# Patient Record
Sex: MALE | URBAN - METROPOLITAN AREA
[De-identification: names, ages, dates, MRNs, and addresses within clinical notes are randomized per-mention and may not be internally consistent; named-entity substitution may affect disease eponyms.]

---

## 2018-08-21 ENCOUNTER — EVALUATION (OUTPATIENT)
Dept: PHYSICAL THERAPY | Facility: CLINIC | Age: 83
End: 2018-08-21
Payer: MEDICARE

## 2018-08-21 ENCOUNTER — TRANSCRIBE ORDERS (OUTPATIENT)
Dept: PHYSICAL THERAPY | Facility: CLINIC | Age: 83
End: 2018-08-21

## 2018-08-21 DIAGNOSIS — R60.0 LOWER EXTREMITY EDEMA: Primary | ICD-10-CM

## 2018-08-21 PROCEDURE — G8978 MOBILITY CURRENT STATUS: HCPCS | Performed by: PHYSICAL THERAPIST

## 2018-08-21 PROCEDURE — G8979 MOBILITY GOAL STATUS: HCPCS | Performed by: PHYSICAL THERAPIST

## 2018-08-21 PROCEDURE — 97162 PT EVAL MOD COMPLEX 30 MIN: CPT | Performed by: PHYSICAL THERAPIST

## 2018-08-21 NOTE — LETTER
2018    Wanderbrian Mcbridedai , [address] [facility] [address]    Patient: Steve Sosa   YOB: 1935   Date of Visit: 2018     Encounter Diagnosis     ICD-10-CM    1  Lower extremity edema R60 0        Dear Dr Soto :    Please review the attached Plan of Care from Shannan John recent visit  Please verify that you agree therapy should continue by signing the attached document and sending it back to our office  If you have any questions or concerns, please don't hesitate to call  Sincerely,    Ravinder Marc PT      Referring Provider:      I certify that I have read the below Plan of Care and certify the need for these services furnished under this plan of treatment while under my care  Ginny Paez DPM  [facility] [address]  VIA Facsimile: [phone]          PT Evaluation     Today's date: 2018  Patient name: Steve Sosa  : 1935  MRN: [id number]  Referring provider: Marcelo Seals DPM  Dx:   Encounter Diagnosis     ICD-10-CM    1  Lower extremity edema R60 0                   Assessment  Impairments: lacks appropriate home exercise program and pain with function  Other impairment: B LE Edema, open wounds  Functional limitations: Limited amb potential, limited activity potential  Assessment details: Pt presents w/ 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, R LE lymphatic exudate from dorsum of foot to medial aspect of ankle w/ superficial wounds, and antalgic gait pattern  Pt had utilized compression wraps in the past but admits they are worn  Pt has been fitted for new garment alternatives (Solaris ready wrap) with plan to establish POC including MLD B LE, use of wraps, and also compression bandaging of R foot/ankle region  Skilled PT is advised  Understanding of Dx/Px/POC: good   Prognosis: good    Goals  ST   Reduce PN <3/10 w/ all activity within 2-3 wks  2  Reduce B LE girth measurements >2 cm within 2-3 wks  LT  Reduce PN <1/10 w/ all activity within 5-6 wks  2  Reduce B LE girth measurements >4cm within 5-6 wks  3  Resume normalized gait w/o AD within 5-6 wks  4  Pt I in phase 2 CDT protocol - self management within 5-6 wks    Plan  Patient would benefit from: skilled physical therapy  Referral necessary: Yes  Planned therapy interventions: manual therapy, massage and compression  Frequency: 3x week  Duration in visits: 15  Duration in weeks: 5  Plan of Care beginning date: 2018  Plan of Care expiration date: 2018  Treatment plan discussed with: patient        Subjective Evaluation    History of Present Illness  Mechanism of injury: Pt admits B LE edema, started approx 4 yrs ago w/ onset while in [facility]  Pt reports seen for tx approx 4 yrs ago, seen x2 visits and was unhappy with care  Maintains wraps he was provided with  Pt denies cardiac hx except HBP -medicated  Denies hx of hepatic issue, but does report renal dysfunction w/ regular f/u w/ kidney specialist   Denies hx of CA  Pt admits his legs have discolored turning "purple and redish/brown"  PN in legs is mild but bothered by walking and standing - annoying discomfort that fluctuates day to day      Recurrent probem    Quality of life: fair    Pain  Current pain ratin  At best pain ratin  At worst pain ratin  Quality: dull ache, discomfort and tight  Relieving factors: rest  Progression: worsening    Treatments  Previous treatment: physical therapy  Patient Goals  Patient goals for therapy: decreased edema, decreased pain, return to sport/leisure activities and independence with ADLs/IADLs          Objective     General Comments     Ankle/Foot Comments   Physical assessment:   Palpation:  Minimal ttp but taut skin w/ clear exudate R dorsum of foot to medial ankle, approx 8 cm proximal to malleolus  Skin Mobility: Poor w/ mod to severe fibrosis B lower legs  Skin color: + Hemosiderin staining B LE  Pitting: +1  Wound presence: R LE dorsum of foot  Wound size: 8cm x10 xm dorsum of foot and 8cm x11cm medial aspect of foot and ankle  Wound color: Superficial, pink, clear exudate, lymph fluid w/ malodorous odor  Stemmer's Sign: +  Gait assessment: Antalgic w/ limited ankle DF/PF, poor hip flexion  Transfer status: I, B UE A   Ability to don/doff clothing/garments: I    Flowsheet Rows      Most Recent Value   girth measurments   Extremity   Lower extremity   LE Girth Measurments  Forefoot, Ankle Figure 8, Malleoli, M+10cm, M+20cm, M+30cm, M+40cm, M+50cm   Forefoot   L Forefoot Initial girth  26 cm   R Forefoot Initial girth  28   Ankle Figure 8   L Ankle Figure 8 Initial girth  64 cm   R Ankle Figure 8 Initial girth  66 cm   Malleoli   L Malleoli Initial girth  31 5 cm   R Malleoli Initial girth  33 cm   M+10cm    L M+10cm Initial girth  34 5 cm   R M+10cm Initial girth  31 5 cm   M+20cm    L M+20cm Initial girth  48 cm   R M+20cm Initial girth  41 5 cm   M+30cm    L M+30cm Initial girth  51 cm   R M+30cm Initial girth  45 cm   M+40cm    L M+40cm Initial girth  47 5 cm   R M+40cm Initial girth  45 cm   M+50cm    L M+50cm Initial girth  53 cm   R M+50cm Initial girth  50 5 cm            Flowsheet Rows      Most Recent Value   PT/OT G-Codes   Current Score  38   Projected Score  60   FOTO information reviewed  Yes   Assessment Type  Evaluation   G code set  Mobility: Walking & Moving Around   Mobility: Walking and Moving Around Current Status ()  CK   Mobility: Walking and Moving Around Goal Status ()  CK          Precautions: B LE lymphedema, R LE open wound    Daily Treatment Diary     Manual  8-21            MLD B LE NV            B LE ready wraps + bandage R foot                                                        Exercise Diary Modalities

## 2018-08-21 NOTE — PROGRESS NOTES
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, R LE lymphatic exudate from dorsum of foot to medial aspect of ankle w/ superficial wounds, and antalgic gait pattern  Pt had utilized compression wraps in the past but admits they are worn  Pt has been fitted for new garment alternatives (Solaris ready wrap) with plan to establish POC including MLD B LE, use of wraps, and also compression bandaging of R foot/ankle region  Skilled PT is advised  Understanding of Dx/Px/POC: good   Prognosis: good    Goals  ST  Reduce PN <3/10 w/ all activity within 2-3 wks  2  Reduce B LE girth measurements >2 cm within 2-3 wks  LT  Reduce PN <1/10 w/ all activity within 5-6 wks  2  Reduce B LE girth measurements >4cm within 5-6 wks  3  Resume normalized gait w/o AD within 5-6 wks  4  Pt I in phase 2 CDT protocol - self management within 5-6 wks    Plan  Patient would benefit from: skilled physical therapy  Referral necessary: Yes  Planned therapy interventions: manual therapy, massage and compression  Frequency: 3x week  Duration in visits: 15  Duration in weeks: 5  Plan of Care beginning date: 2018  Plan of Care expiration date: 2018  Treatment plan discussed with: patient        Subjective Evaluation    History of Present Illness  Mechanism of injury: Pt admits B LE edema, started approx 4 yrs ago w/ onset while in [facility]  Pt reports seen for tx approx 4 yrs ago, seen x2 visits and was unhappy with care  Maintains wraps he was provided with  Pt denies cardiac hx except HBP -medicated  Denies hx of hepatic issue, but does report renal dysfunction w/ regular f/u w/ kidney specialist   Denies hx of CA  Pt admits his legs have discolored turning "purple and redish/brown"  PN in legs is mild but bothered by walking and standing - annoying discomfort that fluctuates day to day      Recurrent probem    Quality of life: fair    Pain  Current pain ratin  At best pain ratin  At worst pain ratin  Quality: dull ache, discomfort and tight  Relieving factors: rest  Progression: worsening    Treatments  Previous treatment: physical therapy  Patient Goals  Patient goals for therapy: decreased edema, decreased pain, return to sport/leisure activities and independence with ADLs/IADLs          Objective     General Comments     Ankle/Foot Comments   Physical assessment:   Palpation:  Minimal ttp but taut skin w/ clear exudate R dorsum of foot to medial ankle, approx 8 cm proximal to malleolus  Skin Mobility: Poor w/ mod to severe fibrosis B lower legs  Skin color: + Hemosiderin staining B LE  Pitting: +1  Wound presence: R LE dorsum of foot  Wound size: 8cm x10 xm dorsum of foot and 8cm x11cm medial aspect of foot and ankle  Wound color: Superficial, pink, clear exudate, lymph fluid w/ malodorous odor  Stemmer's Sign: +  Gait assessment: Antalgic w/ limited ankle DF/PF, poor hip flexion  Transfer status: I, B UE A   Ability to don/doff clothing/garments: I    Flowsheet Rows      Most Recent Value   girth measurments   Extremity   Lower extremity   LE Girth Measurments  Forefoot, Ankle Figure 8, Malleoli, M+10cm, M+20cm, M+30cm, M+40cm, M+50cm   Forefoot   L Forefoot Initial girth  26 cm   R Forefoot Initial girth  28   Ankle Figure 8   L Ankle Figure 8 Initial girth  64 cm   R Ankle Figure 8 Initial girth  66 cm   Malleoli   L Malleoli Initial girth  31 5 cm   R Malleoli Initial girth  33 cm   M+10cm    L M+10cm Initial girth  34 5 cm   R M+10cm Initial girth  31 5 cm   M+20cm    L M+20cm Initial girth  48 cm   R M+20cm Initial girth  41 5 cm   M+30cm    L M+30cm Initial girth  51 cm   R M+30cm Initial girth  45 cm   M+40cm    L M+40cm Initial girth  47 5 cm   R M+40cm Initial girth  45 cm   M+50cm    L M+50cm Initial girth  53 cm   R M+50cm Initial girth  50 5 cm            Flowsheet Rows      Most Recent Value   PT/OT G-Codes   Current Score  38   Projected Score  60   FOTO information reviewed  Yes   Assessment Type  Evaluation   G code set  Mobility: Walking & Moving Around   Mobility: Walking and Moving Around Current Status ()  CK   Mobility: Walking and Moving Around Goal Status ()  CK          Precautions: B LE lymphedema, R LE open wound    Daily Treatment Diary     Manual  8-21            MLD B LE NV            B LE ready wraps + bandage R foot                                                        Exercise Diary                                                                                                                                                                                                                                                                                      Modalities

## 2018-08-28 ENCOUNTER — OFFICE VISIT (OUTPATIENT)
Dept: PHYSICAL THERAPY | Facility: CLINIC | Age: 83
End: 2018-08-28
Payer: MEDICARE

## 2018-08-28 DIAGNOSIS — R60.0 LOWER EXTREMITY EDEMA: Primary | ICD-10-CM

## 2018-08-28 PROCEDURE — 97140 MANUAL THERAPY 1/> REGIONS: CPT | Performed by: PHYSICAL THERAPIST

## 2018-08-28 NOTE — PROGRESS NOTES
Daily Note     Today's date: 2018  Patient name: Alfonso Culver  : 1935  MRN: [id number]  Referring provider: Elisha Hernandez DPM  Dx:   Encounter Diagnosis     ICD-10-CM    1  Lower extremity edema R60 0                   Subjective: Pt presents w/ slight weeping R LE and w/ newly ordered supplies for tx this day  Objective: See treatment diary below      Assessment: Tolerated treatment well  Patient would benefit from continued PT  Good response to initial MLD and application of new wraps including bandaging of R foot to address more severe edema and also present weeping of R ankle and foot  Plan: Continue per plan of care  Precautions: B LE lymphedema, LOB/fall risk    Daily Treatment Diary     Manual              MLD B LE x50'            Solaris ready wraps B LE + bandage R foot/ankle    x10'                                                       Exercise Diary                                                                                                                                                                                                                                                                                      Modalities

## 2018-08-31 ENCOUNTER — OFFICE VISIT (OUTPATIENT)
Dept: PHYSICAL THERAPY | Facility: CLINIC | Age: 83
End: 2018-08-31
Payer: MEDICARE

## 2018-08-31 DIAGNOSIS — R60.0 LOWER EXTREMITY EDEMA: Primary | ICD-10-CM

## 2018-08-31 PROCEDURE — 97140 MANUAL THERAPY 1/> REGIONS: CPT | Performed by: PHYSICAL THERAPIST

## 2018-08-31 NOTE — PROGRESS NOTES
Daily Note     Today's date: 2018  Patient name: Dago Guzman  : 1935  MRN: [id number]  Referring provider: Lisy Sharp DPM  Dx:   Encounter Diagnosis     ICD-10-CM    1  Lower extremity edema R60 0                   Subjective: Pt presents citing reduced girth of B LE, but still a weeping of R LE  Objective: See treatment diary below      Assessment: Tolerated treatment well  Patient would benefit from continued PT  Good response to MLD, added cotton to R ankle to provide additional cushion as pt reports poor tolerance to pressure around foot and ankle region, irritated w/ last session  Assess response NV  Plan: Continue per plan of care  Precautions: B LE lymphedema, LOB/fall risk    Daily Treatment Diary     Manual             MLD B LE x50' x50'           Solaris ready wraps B LE + bandage R foot/ankle    x10' x10'                                                      Exercise Diary                                                                                                                                                                                                                                                                                      Modalities

## 2018-09-10 ENCOUNTER — APPOINTMENT (OUTPATIENT)
Dept: PHYSICAL THERAPY | Facility: CLINIC | Age: 83
End: 2018-09-10
Payer: MEDICARE

## 2018-09-11 ENCOUNTER — OFFICE VISIT (OUTPATIENT)
Dept: PHYSICAL THERAPY | Facility: CLINIC | Age: 83
End: 2018-09-11
Payer: MEDICARE

## 2018-09-11 DIAGNOSIS — R60.0 LOWER EXTREMITY EDEMA: Primary | ICD-10-CM

## 2018-09-11 PROCEDURE — 97140 MANUAL THERAPY 1/> REGIONS: CPT | Performed by: PHYSICAL THERAPIST

## 2018-09-11 NOTE — PROGRESS NOTES
Daily Note     Today's date: 2018  Patient name: Albina Valdovinos  : 1935  MRN: [id number]  Referring provider: Alan Christine DPM  Dx:   Encounter Diagnosis     ICD-10-CM    1  Lower extremity edema R60 0                   Subjective: Pt admits managing wraps while therapist away, however c/o this past Saturday x approx 18 hrs a sharp throbbing PN in ankle R LE, since subsided  Darylene Brake has reduced  Objective: See treatment diary below      Assessment: Tolerated treatment well  Patient would benefit from continued PT  Reduced weeping now minimal noted R LE, however significant dry scaly skin persists R>L LE  Overall vol reduction advancing  Plan: Continue per plan of care  Precautions: B LE lymphedema, LOB/fall risk    Daily Treatment Diary     Manual   8 9-11          MLD B LE x50' x50' x50'          Solaris ready wraps B LE + bandage R foot/ankle    x10' x10' x10'                                                      Exercise Diary                                                                                                                                                                                                                                                                                      Modalities

## 2018-09-12 ENCOUNTER — OFFICE VISIT (OUTPATIENT)
Dept: PHYSICAL THERAPY | Facility: CLINIC | Age: 83
End: 2018-09-12
Payer: MEDICARE

## 2018-09-12 DIAGNOSIS — R60.0 LOWER EXTREMITY EDEMA: Primary | ICD-10-CM

## 2018-09-12 PROCEDURE — 97140 MANUAL THERAPY 1/> REGIONS: CPT | Performed by: PHYSICAL THERAPIST

## 2018-09-12 NOTE — PROGRESS NOTES
Daily Note     Today's date: 2018  Patient name: Winsome Fine  : 1935  MRN: [id number]  Referring provider: Adrianna Thornton DPM  Dx:   Encounter Diagnosis     ICD-10-CM    1  Lower extremity edema R60 0                   Subjective: Pt states "just one area of weeping noted R LE today "  Objective: See treatment diary below      Assessment: Tolerated treatment well  Patient would benefit from continued PT  Cont'd vol reduction advancing B LE, slight reduction in overall weeping and open regions R LE  Plan: Continue per plan of care  Precautions: B LE lymphedema, LOB/fall risk    Daily Treatment Diary     Manual  8 8-31 9-11 9-12         MLD B LE x50' x50' x50' x45'         Solaris ready wraps B LE + bandage R foot/ankle    x10' x10' x10'  x10'                                                    Exercise Diary                                                                                                                                                                                                                                                                                      Modalities

## 2018-09-13 ENCOUNTER — OFFICE VISIT (OUTPATIENT)
Dept: PHYSICAL THERAPY | Facility: CLINIC | Age: 83
End: 2018-09-13
Payer: MEDICARE

## 2018-09-13 DIAGNOSIS — R60.0 LOWER EXTREMITY EDEMA: Primary | ICD-10-CM

## 2018-09-13 PROCEDURE — 97140 MANUAL THERAPY 1/> REGIONS: CPT | Performed by: PHYSICAL THERAPIST

## 2018-09-13 NOTE — PROGRESS NOTES
Daily Note     Today's date: 2018  Patient name: Alfonso Culver  : 1935  MRN: [id number]  Referring provider: Elisha Hernandez DPM  Dx:   Encounter Diagnosis     ICD-10-CM    1  Lower extremity edema R60 0                   Subjective: Pt states I took a lot of skin off this morning when I showered, also admits PN in R ankle this AM   Rated 5-6/10  Objective: See treatment diary below      Assessment: Tolerated treatment well  Patient would benefit from continued PT  Minimal weeping R LE this AM, mild R medial ankle region  Reduced overall  Plan: Continue per plan of care  Precautions: B LE lymphedema, LOB/fall risk    Daily Treatment Diary     Manual  8- 8-31 9-11 9-12 9-13        MLD B LE x50' x50' x50' x45' x45'        Solaris ready wraps B LE + bandage R foot/ankle    x10' x10' x10'  x10' x10'                                                   Exercise Diary                                                                                                                                                                                                                                                                                      Modalities

## 2018-09-17 ENCOUNTER — OFFICE VISIT (OUTPATIENT)
Dept: PHYSICAL THERAPY | Facility: CLINIC | Age: 83
End: 2018-09-17
Payer: MEDICARE

## 2018-09-17 DIAGNOSIS — R60.0 LOWER EXTREMITY EDEMA: Primary | ICD-10-CM

## 2018-09-17 PROCEDURE — 97140 MANUAL THERAPY 1/> REGIONS: CPT | Performed by: PHYSICAL THERAPIST

## 2018-09-17 NOTE — PROGRESS NOTES
Daily Note     Today's date: 2018  Patient name: Mahesh Livingston  : 1935  MRN: [id number]  Referring provider: Peyman Maxwell DPM  Dx:   Encounter Diagnosis     ICD-10-CM    1  Lower extremity edema R60 0                   Subjective: Pt reports about 50% less exudate since starting, noticing less edema overall, but PN in ankle and legs limited his ability to shoot competitively OTW  Objective: See treatment diary below      Assessment: Tolerated treatment well  Patient would benefit from continued PT  Medial ankle exudate realized this AM as MLD performed  Pt inquires as to suitable creams to apply to aide in PN reduction R foot/heel  Monitor closely to ensure minimal infection risk  Plan: Continue per plan of care  Precautions: B LE lymphedema, LOB/fall risk    Daily Treatment Diary     Manual  8 8 9-11 9-12 9-13 9-17       MLD B LE x50' x50' x50' x45' x45' x50''       Solaris ready wraps B LE + bandage R foot/ankle    x10' x10' x10'  x10' x10' x10'                                                  Exercise Diary                                                                                                                                                                                                                                                                                      Modalities

## 2018-09-19 ENCOUNTER — OFFICE VISIT (OUTPATIENT)
Dept: PHYSICAL THERAPY | Facility: CLINIC | Age: 83
End: 2018-09-19
Payer: MEDICARE

## 2018-09-19 DIAGNOSIS — R60.0 LOWER EXTREMITY EDEMA: Primary | ICD-10-CM

## 2018-09-19 PROCEDURE — G8979 MOBILITY GOAL STATUS: HCPCS | Performed by: PHYSICAL THERAPIST

## 2018-09-19 PROCEDURE — G8980 MOBILITY D/C STATUS: HCPCS | Performed by: PHYSICAL THERAPIST

## 2018-09-19 PROCEDURE — 97140 MANUAL THERAPY 1/> REGIONS: CPT | Performed by: PHYSICAL THERAPIST

## 2018-09-19 PROCEDURE — G8978 MOBILITY CURRENT STATUS: HCPCS | Performed by: PHYSICAL THERAPIST

## 2018-09-19 NOTE — PROGRESS NOTES
Daily Note + PT Discharge     Today's date: 2018  Patient name: Sheila Gomez  : 1935  MRN: [id number]  Referring provider: Josiah Chakraborty DPM  Dx:   Encounter Diagnosis     ICD-10-CM    1  Lower extremity edema R60 0                   Subjective: Pt presents w/ increased redness thru R foot, non-maplike borders, extending to distal third of lower leg anteriorly and wrapping around medial and lat malleoli  Pt admits more PN and also visible clear exudate is realized this day w/ more then typical collecting along sock  Objective: See treatment diary below      Assessment: Tolerated treatment well  Patient would benefit from continued PT   Pt's family MD office contact by me this day w/ pt present, attempted to discuss w/ pt care team but opted to schedule dr joe at 1 pm this day w/ pt's PCP office over concerns of above stated symptoms and probable need for antibiotics  Pt does admit hx of antibiotic tx >2 months ago which had helped to resolve similar sx's  Plan: Pt now d/c skilled PT secondary to complications regarding cellulitis and MD placing pt on hold  Precautions: B LE lymphedema, LOB/fall risk    Daily Treatment Diary     Manual   8 9-11 9-12 9-13 9-17 9-19      MLD B LE x50' x50' x50' x45' x45' x50'' x50'      Solaris ready wraps B LE + bandage R foot/ankle    x10' x10' x10'  x10' x10' x10' x10'                                                 Exercise Diary                                                                                                                                                                                                                                                                                      Modalities

## 2018-09-20 ENCOUNTER — APPOINTMENT (OUTPATIENT)
Dept: PHYSICAL THERAPY | Facility: CLINIC | Age: 83
End: 2018-09-20
Payer: MEDICARE

## 2018-09-24 ENCOUNTER — APPOINTMENT (OUTPATIENT)
Dept: PHYSICAL THERAPY | Facility: CLINIC | Age: 83
End: 2018-09-24
Payer: MEDICARE

## 2018-09-26 ENCOUNTER — APPOINTMENT (OUTPATIENT)
Dept: PHYSICAL THERAPY | Facility: CLINIC | Age: 83
End: 2018-09-26
Payer: MEDICARE

## 2018-09-28 ENCOUNTER — APPOINTMENT (OUTPATIENT)
Dept: PHYSICAL THERAPY | Facility: CLINIC | Age: 83
End: 2018-09-28
Payer: MEDICARE

## 2019-01-07 ENCOUNTER — TRANSCRIBE ORDERS (OUTPATIENT)
Dept: PHYSICAL THERAPY | Facility: CLINIC | Age: 84
End: 2019-01-07

## 2019-01-07 ENCOUNTER — EVALUATION (OUTPATIENT)
Dept: PHYSICAL THERAPY | Facility: CLINIC | Age: 84
End: 2019-01-07
Payer: MEDICARE

## 2019-01-07 DIAGNOSIS — I89.0 CHRONIC ACQUIRED LYMPHEDEMA: Primary | ICD-10-CM

## 2019-01-07 DIAGNOSIS — I89.0 LYMPHEDEMA: Primary | ICD-10-CM

## 2019-01-07 PROCEDURE — 97162 PT EVAL MOD COMPLEX 30 MIN: CPT | Performed by: PHYSICAL THERAPIST

## 2019-01-07 NOTE — LETTER
2019    Jose Barrett DPM  [id number] [place] [id number]    Patient: Chana Pettit   YOB: 1935   Date of Visit: 2019     Encounter Diagnosis     ICD-10-CM    1  Lymphedema I89 0        Dear Dr Efren Laguerre:    Please review the attached Plan of Care from Herbert Sanchez recent visit  Please verify that you agree therapy should continue by signing the attached document and sending it back to our office  If you have any questions or concerns, please don't hesitate to call  Sincerely,    Penelope Deleon, PT      Referring Provider:      I certify that I have read the below Plan of Care and certify the need for these services furnished under this plan of treatment while under my care  Jose Barrett DPM  [facility]  VIA Facsimile: [phone]          PT Evaluation     Today's date: 2019  Patient name: Chana Pettit  : 1935  MRN: [id number]  Referring provider: Nnamdi Vaz DPM  Dx:   Encounter Diagnosis     ICD-10-CM    1  Lymphedema I89 0                   Assessment  Assessment details: B LE edema is noted w/ severe skin deterioration R lower leg and marked hemosiderin staining B  Skilled PT is necessary to address including full CDT protocol to involve MLD, compression wraps and bandaging as appropriate w/ education regarding skin care and daily application of lotion including Rx cream     Other impairment: B LE edema  Functional limitations: Limited amb potential and poor balanceUnderstanding of Dx/Px/POC: good   Prognosis: good    Goals  ST  Reduce B LE PN < 3/10 w/ all activity within 3 wks  2  Reduce B LE girth measurements >1 cm within 3 wks  LT  Reduce B LE girth measurements >3 cm within 6 wks  2  Reduce PN <1/10 within 6 wks  3   I in phase 2 CDT protocol within 6 wks    Plan  Patient would benefit from: skilled physical therapy  Referral necessary: Yes  Planned therapy interventions: manual therapy, massage and compression  Frequency: 3x week  Duration in visits: 15  Duration in weeks: 5  Plan of Care beginning date: 2019  Plan of Care expiration date: 2019  Treatment plan discussed with: patient        Subjective Evaluation    History of Present Illness  Mechanism of injury: Pt presents to PT w/ c/o B LE edema, R>L LE w/ sx's fluctuating but worse w/o compression  Pt had initially been seen for skilled PT to address utilizing CDT protocol however had been placed on hold and then d/c following a bout of cellulitis  Pt now returns admitting overall better management of legs but sx's more recently worsened and at times affecting his balance , amb, and activity potential   Pt utilizes a variety of Rx creams for legs and maintains wraps  Recurrent probem    Quality of life: fair    Pain  Current pain ratin  At best pain ratin  At worst pain ratin  Quality: dull ache, discomfort, cramping, tight and squeezing  Relieving factors: rest and support  Aggravating factors: walking    Treatments  Previous treatment: physical therapy  Patient Goals  Patient goals for therapy: decreased edema, independence with ADLs/IADLs, return to sport/leisure activities and improved balance          Objective     General Comments     Ankle/Foot Comments   Physical assessment: R LE severe scaling and dryness of skin w/ mod edema  Palpation: Mild ttp R>L LE  Skin Mobility: L LE fair to poor, R LE Poor w/ mod to severe fibrosis  Skin color: + hemosiderin staining B LE distal 1/2 of B lower legs  Pitting: +1  Wound presence: R LE multiple surface wounds 2cm x 1cm and 2cm x3 5cm lower yaya-medial aspect of leg w/ small bloody exudate from pt scratching at leg  Wound size: see above  Wound color: see above  Stemmer's Sign: + B LE  Gait assessment: Antalgic, poor balance w/ limited heel to toe, slight festination and reduced MICHELLE     Transfer status:  I w/ B UE A  Ability to don/doff clothing/garments: Limited         Flowsheet Rows      Most Recent Value   girth measurments   Extremity   Lower extremity   LE Girth Measurments  Forefoot, Ankle Figure 8, Malleoli, M+10cm, M+20cm, M+30cm, M+40cm, M+50cm   Forefoot   L Forefoot Initial girth  27 cm   R Forefoot Initial girth  27 5   Ankle Figure 8   L Ankle Figure 8 Initial girth  65 cm   R Ankle Figure 8 Initial girth  64 cm   Malleoli   L Malleoli Initial girth  34 cm   R Malleoli Initial girth  31 5 cm   M+10cm    L M+10cm Initial girth  31 cm   R M+10cm Initial girth  29 5 cm   M+20cm    L M+20cm Initial girth  39 5 cm   R M+20cm Initial girth  38 75 cm   M+30cm    L M+30cm Initial girth  45 cm   R M+30cm Initial girth  43 5 cm   M+40cm    L M+40cm Initial girth  46 cm   R M+40cm Initial girth  48 cm   M+50cm    L M+50cm Initial girth  51 5 cm   R M+50cm Initial girth  52 25 cm            Precautions: NKA, B LE Lymphedema    Daily Treatment Diary     Manual  1-7            MLD B LE NV            B LE ready wraps + compression bandage NV                                                       Exercise Diary                                                                                                                                                                                                                                                                                      Modalities

## 2019-01-07 NOTE — PROGRESS NOTES
PT Evaluation     Today's date: 2019  Patient name: Marcy Lassiter  : 1935  MRN: [id number]  Referring provider: Pili Vanessa DPM  Dx:   Encounter Diagnosis     ICD-10-CM    1  Lymphedema I89 0                   Assessment  Assessment details: B LE edema is noted w/ severe skin deterioration R lower leg and marked hemosiderin staining B  Skilled PT is necessary to address including full CDT protocol to involve MLD, compression wraps and bandaging as appropriate w/ education regarding skin care and daily application of lotion including Rx cream     Other impairment: B LE edema  Functional limitations: Limited amb potential and poor balanceUnderstanding of Dx/Px/POC: good   Prognosis: good    Goals  ST  Reduce B LE PN < 3/10 w/ all activity within 3 wks  2  Reduce B LE girth measurements >1 cm within 3 wks  LT  Reduce B LE girth measurements >3 cm within 6 wks  2  Reduce PN <1/10 within 6 wks  3  I in phase 2 CDT protocol within 6 wks    Plan  Patient would benefit from: skilled physical therapy  Referral necessary: Yes  Planned therapy interventions: manual therapy, massage and compression  Frequency: 3x week  Duration in visits: 15  Duration in weeks: 5  Plan of Care beginning date: 2019  Plan of Care expiration date: 2019  Treatment plan discussed with: patient        Subjective Evaluation    History of Present Illness  Mechanism of injury: Pt presents to PT w/ c/o B LE edema, R>L LE w/ sx's fluctuating but worse w/o compression  Pt had initially been seen for skilled PT to address utilizing CDT protocol however had been placed on hold and then d/c following a bout of cellulitis  Pt now returns admitting overall better management of legs but sx's more recently worsened and at times affecting his balance , amb, and activity potential   Pt utilizes a variety of Rx creams for legs and maintains wraps     Recurrent probem    Quality of life: fair    Pain  Current pain rating: 4  At best pain ratin  At worst pain ratin  Quality: dull ache, discomfort, cramping, tight and squeezing  Relieving factors: rest and support  Aggravating factors: walking    Treatments  Previous treatment: physical therapy  Patient Goals  Patient goals for therapy: decreased edema, independence with ADLs/IADLs, return to sport/leisure activities and improved balance          Objective     General Comments     Ankle/Foot Comments   Physical assessment: R LE severe scaling and dryness of skin w/ mod edema  Palpation: Mild ttp R>L LE  Skin Mobility: L LE fair to poor, R LE Poor w/ mod to severe fibrosis  Skin color: + hemosiderin staining B LE distal 1/2 of B lower legs  Pitting: +1  Wound presence: R LE multiple surface wounds 2cm x 1cm and 2cm x3 5cm lower yaya-medial aspect of leg w/ small bloody exudate from pt scratching at leg  Wound size: see above  Wound color: see above  Stemmer's Sign: + B LE  Gait assessment: Antalgic, poor balance w/ limited heel to toe, slight festination and reduced MICHELLE     Transfer status:  I w/ B UE A  Ability to don/doff clothing/garments: Limited         Flowsheet Rows      Most Recent Value   girth measurments   Extremity   Lower extremity   LE Girth Measurments  Forefoot, Ankle Figure 8, Malleoli, M+10cm, M+20cm, M+30cm, M+40cm, M+50cm   Forefoot   L Forefoot Initial girth  27 cm   R Forefoot Initial girth  27 5   Ankle Figure 8   L Ankle Figure 8 Initial girth  65 cm   R Ankle Figure 8 Initial girth  64 cm   Malleoli   L Malleoli Initial girth  34 cm   R Malleoli Initial girth  31 5 cm   M+10cm    L M+10cm Initial girth  31 cm   R M+10cm Initial girth  29 5 cm   M+20cm    L M+20cm Initial girth  39 5 cm   R M+20cm Initial girth  38 75 cm   M+30cm    L M+30cm Initial girth  45 cm   R M+30cm Initial girth  43 5 cm   M+40cm    L M+40cm Initial girth  46 cm   R M+40cm Initial girth  48 cm   M+50cm    L M+50cm Initial girth  51 5 cm   R M+50cm Initial girth  52 25 cm Precautions: NKA, B LE Lymphedema    Daily Treatment Diary     Manual  1-7            MLD B LE NV            B LE ready wraps + compression bandage NV                                                       Exercise Diary                                                                                                                                                                                                                                                                                      Modalities

## 2019-01-14 ENCOUNTER — OFFICE VISIT (OUTPATIENT)
Dept: PHYSICAL THERAPY | Facility: CLINIC | Age: 84
End: 2019-01-14
Payer: MEDICARE

## 2019-01-14 DIAGNOSIS — I89.0 LYMPHEDEMA: Primary | ICD-10-CM

## 2019-01-14 PROCEDURE — 97140 MANUAL THERAPY 1/> REGIONS: CPT | Performed by: PHYSICAL THERAPIST

## 2019-01-14 NOTE — PROGRESS NOTES
Daily Note     Today's date: 2019  Patient name: Karla Calles  : 1935  MRN: [id number]  Referring provider: Rolanda Gregory DPM  Dx:   Encounter Diagnosis     ICD-10-CM    1  Lymphedema I89 0                   Subjective: Presents wraps and lotion for B LE this day  Objective: See treatment diary below      Assessment: Tolerated treatment well  Patient would benefit from continued PT  Observed L lower leg across anterior aspect a 0 25cm x 3 cm scratch w/ slight bleeding, advised pt to monitor  Good initial response to MLD  Plan: Continue per plan of care       Precautions: NKA, COURTNEY LE Lymphedema    Daily Treatment Diary     Manual  1-7 1-14           MLD B LE NV x45'           B LE ready wraps + compression bandage NV x10'                                                      Exercise Diary                                                                                                                                                                                                                                                                                      Modalities

## 2019-01-16 ENCOUNTER — OFFICE VISIT (OUTPATIENT)
Dept: PHYSICAL THERAPY | Facility: CLINIC | Age: 84
End: 2019-01-16
Payer: MEDICARE

## 2019-01-16 DIAGNOSIS — I89.0 LYMPHEDEMA: Primary | ICD-10-CM

## 2019-01-16 PROCEDURE — 97140 MANUAL THERAPY 1/> REGIONS: CPT | Performed by: PHYSICAL THERAPIST

## 2019-01-16 NOTE — PROGRESS NOTES
Daily Note     Today's date: 2019  Patient name: Higinio Lassiter  : 1935  MRN: [id number]  Referring provider: Oscar Vivas DPM  Dx:   Encounter Diagnosis     ICD-10-CM    1  Lymphedema I89 0                   Subjective: Pt admits legs "doing okay" citing mild reduction w/ last tx session  Objective: See treatment diary below      Assessment: Tolerated treatment well  Patient would benefit from continued PT  Mild reduction observed, assessed grossly along w/ better skin mobility but still marked discoloration  Plan: Continue per plan of care       Precautions: COURTNEY VIERA LE Lymphedema    Daily Treatment Diary     Manual  1-7 1-14 1-16          MLD B LE NV x45' jph          B LE ready wraps + compression bandage NV x10' jph                                       x55'              Exercise Diary                                                                                                                                                                                                                                                                                      Modalities

## 2019-01-18 ENCOUNTER — OFFICE VISIT (OUTPATIENT)
Dept: PHYSICAL THERAPY | Facility: CLINIC | Age: 84
End: 2019-01-18
Payer: MEDICARE

## 2019-01-18 DIAGNOSIS — I89.0 LYMPHEDEMA: Primary | ICD-10-CM

## 2019-01-18 PROCEDURE — 97140 MANUAL THERAPY 1/> REGIONS: CPT | Performed by: PHYSICAL THERAPIST

## 2019-01-18 NOTE — PROGRESS NOTES
Daily Note     Today's date: 2019  Patient name: Higinio Lassiter  : 1935  MRN: [id number]  Referring provider: Oscar Vivas DPM  Dx:   Encounter Diagnosis     ICD-10-CM    1  Lymphedema I89 0                   Subjective: Pt admits legs "doing okay" citing mild reduction w/ last tx session  Objective: See treatment diary below      Assessment: Tolerated treatment well  Patient would benefit from continued PT  Mild reduction observed, assessed grossly along w/ better skin mobility but still marked discoloration  Plan: Continue per plan of care       Precautions: COURTNEY VIERA Lymphedema    Daily Treatment Diary     Manual  1-7 1-14 1-16          MLD B LE NV x45' jph          B LE ready wraps + compression bandage NV x10' jph                                       x55'              Exercise Diary                                                                                                                                                                                                                                                                                      Modalities

## 2019-01-21 ENCOUNTER — APPOINTMENT (OUTPATIENT)
Dept: PHYSICAL THERAPY | Facility: CLINIC | Age: 84
End: 2019-01-21
Payer: MEDICARE

## 2019-01-23 ENCOUNTER — OFFICE VISIT (OUTPATIENT)
Dept: PHYSICAL THERAPY | Facility: CLINIC | Age: 84
End: 2019-01-23
Payer: MEDICARE

## 2019-01-23 DIAGNOSIS — I89.0 LYMPHEDEMA: Primary | ICD-10-CM

## 2019-01-23 PROCEDURE — 97140 MANUAL THERAPY 1/> REGIONS: CPT | Performed by: PHYSICAL THERAPIST

## 2019-01-23 NOTE — PROGRESS NOTES
Daily Note     Today's date: 2019  Patient name: Alee Andrade  : 1935  MRN: [id number]  Referring provider: Delmi Mendoza DPM  Dx:   Encounter Diagnosis     ICD-10-CM    1  Lymphedema I89 0                   Subjective: Pt admits wearing wraps overnight as he had not put them on earlier yesterday by accident  Objective: See treatment diary below      Assessment: Tolerated treatment well  Patient would benefit from continued PT  Good reduction B LE w/ overall improved skin health, R LE remains more edematous that L  Plan: Continue per plan of care       Precautions: COURTNEY VIERA Lymphedema    Daily Treatment Diary     Manual  1-7 1-14 1-16          MLD B LE NV x45' [facility]         B LE ready wraps + [facility]' [facility]                                      x55' x55'             Exercise Diary                                                                                                                                                                                                                                                                                      Modalities

## 2019-01-25 ENCOUNTER — OFFICE VISIT (OUTPATIENT)
Dept: PHYSICAL THERAPY | Facility: CLINIC | Age: 84
End: 2019-01-25
Payer: MEDICARE

## 2019-01-25 DIAGNOSIS — I89.0 LYMPHEDEMA: Primary | ICD-10-CM

## 2019-01-25 PROCEDURE — 97140 MANUAL THERAPY 1/> REGIONS: CPT | Performed by: PHYSICAL THERAPIST

## 2019-01-25 NOTE — PROGRESS NOTES
Daily Note     Today's date: 2019  Patient name: Marcy Lassiter  : 1935  MRN: [id number]  Referring provider: Pili Vanessa DPM  Dx:   Encounter Diagnosis     ICD-10-CM    1  Lymphedema I89 0                   Subjective: Pt reports B LE dry skin pretty significant  Hasn't applied lotion yet this AM     Objective: See treatment diary below      Assessment: Tolerated treatment well  Patient would benefit from continued PT  Moderate fibrosis and heaviness to B LE this AM, overall vol reduction but pretty significant dry scaly skin noted  Advised pt to maintain lotion 2x per day and wraps t/o the waking hrs of the day  Plan: Continue per plan of care       Precautions: COURTNEY VIERA LE Lymphedema    Daily Treatment Diary     Manual  1-7 1-14 1-16 1 1-        MLD B LE NV x45' jph [facility]        B LE ready wraps + compression bandage NV x10' jph jph jph                                     x55' x55' x60'            Exercise Diary                                                                                                                                                                                                                                                                                      Modalities

## 2019-01-28 ENCOUNTER — OFFICE VISIT (OUTPATIENT)
Dept: PHYSICAL THERAPY | Facility: CLINIC | Age: 84
End: 2019-01-28
Payer: MEDICARE

## 2019-01-28 DIAGNOSIS — I89.0 LYMPHEDEMA: Primary | ICD-10-CM

## 2019-01-28 PROCEDURE — 97140 MANUAL THERAPY 1/> REGIONS: CPT | Performed by: PHYSICAL THERAPIST

## 2019-01-28 NOTE — PROGRESS NOTES
Daily Note     Today's date: 2019  Patient name: Jose Manuel Ricci  : 1935  MRN: [id number]  Referring provider: Gabriela Joseph DPM  Dx:   Encounter Diagnosis     ICD-10-CM    1  Lymphedema I89 0                   Subjective: Pt admits R calf burning yesterday, removed wraps, sx's resolved  Objective: See treatment diary below      Assessment: Tolerated treatment well  Patient would benefit from continued PT  No indicators of infection of DVT this day R LE and sx's resolved from yesterday  Cont care as B LE edema moderate this day and cont'd CDT required  Plan: Continue per plan of care       Precautions: NKA, B LE Lymphedema    Daily Treatment Diary     Manual  -14        MLD B LE NV x45' jph jph jph jph       B LE ready wraps + compression bandage NV x10' jph jph jph jph                                    Q75' x55' x60' x55'           Exercise Diary                                                                                                                                                                                                                                                                                      Modalities

## 2019-01-30 ENCOUNTER — OFFICE VISIT (OUTPATIENT)
Dept: PHYSICAL THERAPY | Facility: CLINIC | Age: 84
End: 2019-01-30
Payer: MEDICARE

## 2019-01-30 DIAGNOSIS — I89.0 LYMPHEDEMA: Primary | ICD-10-CM

## 2019-01-30 PROCEDURE — 97140 MANUAL THERAPY 1/> REGIONS: CPT | Performed by: PHYSICAL THERAPIST

## 2019-01-30 NOTE — PROGRESS NOTES
Daily Note     Today's date: 2019  Patient name: Johanne Marks  : 1935  MRN: [id number]  Referring provider: Hugo Rodriguez DPM  Dx:   Encounter Diagnosis     ICD-10-CM    1  Lymphedema I89 0                   Subjective: Pt admits B LE doing better, applying lotion daily  Does have knee PN B LE, worse in knees as of late  Objective: See treatment diary below    Assessment: Tolerated treatment well  Patient would benefit from continued PT  B LE improving, overall skin integrity better and vol reducing,  Trial reduced to 2x/wl for next 2 wks  Plan: Continue per plan of care       Precautions: NKA, B LE Lymphedema    Daily Treatment Diary     Manual  1-7 1-14 1-16       MLD B LE NV x45' [facility]      B LE ready wraps + compression bandage NV x10' [facility]                                   R01' x55' x60' x55' x55'          Exercise Diary                                                                                                                                                                                                                                                                                      Modalities

## 2019-02-01 ENCOUNTER — OFFICE VISIT (OUTPATIENT)
Dept: PHYSICAL THERAPY | Facility: CLINIC | Age: 84
End: 2019-02-01
Payer: MEDICARE

## 2019-02-01 DIAGNOSIS — I89.0 LYMPHEDEMA: Primary | ICD-10-CM

## 2019-02-01 PROCEDURE — 97140 MANUAL THERAPY 1/> REGIONS: CPT | Performed by: PHYSICAL THERAPIST

## 2019-02-01 NOTE — PROGRESS NOTES
Daily Note     Today's date: 2019  Patient name: Armida Singh  : 1935  MRN: [id number]  Referring provider: Kolby Courtney DPM  Dx:   Encounter Diagnosis     ICD-10-CM    1  Lymphedema I89 0                   Subjective: Pt reports PN in knees not as severe this day but still sore  Objective: See treatment diary below    Assessment: Tolerated treatment well  Patient would benefit from continued PT Vol reducing overall, will assess measurements NV  Reduced fibrosis noted thru B lower legs and pt maintaining wraps daily  Plan: Continue per plan of care       Precautions: COURTNEY VIERA LE Lymphedema    Daily Treatment Diary     Manual  1-7 1-14 1-16 1- 1 130 2-1     MLD B LE NV x45' jph jph jph jph jph jph     B LE ready wraps + compression bandage NV x10' jph jph jph jph jph jph                                  A22' x55' x60' x55' x55' x53'         Exercise Diary                                                                                                                                                                                                                                                                                      Modalities

## 2019-02-04 ENCOUNTER — OFFICE VISIT (OUTPATIENT)
Dept: PHYSICAL THERAPY | Facility: CLINIC | Age: 84
End: 2019-02-04
Payer: MEDICARE

## 2019-02-04 DIAGNOSIS — I89.0 LYMPHEDEMA: Primary | ICD-10-CM

## 2019-02-04 PROCEDURE — 97140 MANUAL THERAPY 1/> REGIONS: CPT | Performed by: PHYSICAL THERAPIST

## 2019-02-04 NOTE — PROGRESS NOTES
Daily Note     Today's date: 2019  Patient name: Marycarmen Giron  : 1935  MRN: [id number]  Referring provider: María Hwang DPM  Dx:   Encounter Diagnosis     ICD-10-CM    1  Lymphedema I89 0                   Subjective: Pt c/o PN B LE "like they are in vice " worse today for unknown reasons  Objective: See treatment diary below    Assessment: Tolerated treatment well  Patient would benefit from continued PT Pt admits mild PN reduction when wearing wraps  To see PN mngmt upcoming this wk  Plan: Continue per plan of care       Precautions: COURTNEY VIERA Lymphedema    Daily Treatment Diary     Manual  1-7 1-14 1-16 1- 1 1 1-30 2-1 2-4    MLD B LE NV x45' jph jph jph jph jph jph jph    B LE ready wraps + compression bandage NV x10' jph jph jph jph jph jph jph                                 U71' x55' x60' x55' x55' x53' x55'        Exercise Diary                                                                                                                                                                                                                                                                                      Modalities

## 2019-02-07 ENCOUNTER — OFFICE VISIT (OUTPATIENT)
Dept: PHYSICAL THERAPY | Facility: CLINIC | Age: 84
End: 2019-02-07
Payer: MEDICARE

## 2019-02-07 DIAGNOSIS — I89.0 LYMPHEDEMA: Primary | ICD-10-CM

## 2019-02-07 PROCEDURE — 97140 MANUAL THERAPY 1/> REGIONS: CPT | Performed by: PHYSICAL THERAPIST

## 2019-02-07 NOTE — PROGRESS NOTES
Daily Note     Today's date: 2019  Patient name: Karla Calles  : 1935  MRN: [id number]  Referring provider: Rolanda Gregory DPM  Dx:   Encounter Diagnosis     ICD-10-CM    1  Lymphedema I89 0                   Subjective: Pt reports PN not as severe this day as it was the previous session  Objective: See treatment diary below    Assessment: Tolerated treatment well  Patient would benefit from continued PT Overall vol reduction achieved and maintained better, cont'd improvement in skin health desired  Plan: Continue per plan of care       Precautions: COURTNEY VIERA LE Lymphedema    Daily Treatment Diary     Manual  -7 1-14 1-16 1-23 1-25 1-28 1-30 2-1 2-4 2-7   MLD B LE NV x45' jph jph jph jph jph jph jph jph   B LE ready wraps + compression bandage NV x10' jph jph jph jph jph jph jph jph                                C04' x55' x60' x55' x55' x53' x55' x55'       Exercise Diary                                                                                                                                                                                                                                                                                      Modalities

## 2019-02-11 ENCOUNTER — OFFICE VISIT (OUTPATIENT)
Dept: PHYSICAL THERAPY | Facility: CLINIC | Age: 84
End: 2019-02-11
Payer: MEDICARE

## 2019-02-11 DIAGNOSIS — I89.0 LYMPHEDEMA: Primary | ICD-10-CM

## 2019-02-11 PROCEDURE — 97140 MANUAL THERAPY 1/> REGIONS: CPT | Performed by: PHYSICAL THERAPIST

## 2019-02-11 NOTE — PROGRESS NOTES
Daily Note     Today's date: 2019  Patient name: Aurther Gosselin  : 1935  MRN: [id number]  Referring provider: Mj Sebastian DPM  Dx:   Encounter Diagnosis     ICD-10-CM    1  Lymphedema I89 0                   Subjective: Pt has minimal c/o this day, admits legs dryer than usual      Objective: See treatment diary below    Assessment: Tolerated treatment well  Patient would benefit from continued PT  Increased dry scaly skin, R>L noted this AM w/ what appears to be greater discoloration  Plan to monitor  Pt admits his foot had been more swollen OTW but reduced this day  Plan: Continue per plan of care       Precautions: NKA, B LE Lymphedema    Daily Treatment Diary     Manual  2-11         2-7   MLD B LE [facility]   B LE ready wraps + compression bandage [facility]                              x50'         x55'       Exercise Diary                                                                                                                                                                                                                                                                                      Modalities

## 2019-02-12 ENCOUNTER — APPOINTMENT (OUTPATIENT)
Dept: PHYSICAL THERAPY | Facility: CLINIC | Age: 84
End: 2019-02-12
Payer: MEDICARE

## 2019-02-19 ENCOUNTER — OFFICE VISIT (OUTPATIENT)
Dept: PHYSICAL THERAPY | Facility: CLINIC | Age: 84
End: 2019-02-19
Payer: MEDICARE

## 2019-02-19 DIAGNOSIS — I89.0 LYMPHEDEMA: Primary | ICD-10-CM

## 2019-02-19 PROCEDURE — 97140 MANUAL THERAPY 1/> REGIONS: CPT | Performed by: PHYSICAL THERAPIST

## 2019-02-19 NOTE — PROGRESS NOTES
Daily Note     Today's date: 2019  Patient name: Camilo Qureshi  : 1935  MRN: [id number]  Referring provider: Magdalena Mccann DPM  Dx:   Encounter Diagnosis     ICD-10-CM    1  Lymphedema I89 0                   Subjective: Pt has c/o B knee PN, worse w/ heavier activity and more aggravated today on acct of change of weather  Objective: See treatment diary below    Assessment: Tolerated treatment well  Patient would benefit from continued PT  Overall vol reduced and discoloration less significant B Lower legs this day in comparisson w/ previous visit  Plan: Continue per plan of care       Precautions: COURTNEY VIERA Lymphedema    Daily Treatment Diary     Manual  2-11 2-19        2-7   MLD B BETHANY jph jph        laya SIMS ready wraps + compression bandage [facility]' x55'        x55'       Exercise Diary                                                                                                                                                                                                                                                                                      Modalities

## 2019-02-20 ENCOUNTER — APPOINTMENT (OUTPATIENT)
Dept: PHYSICAL THERAPY | Facility: CLINIC | Age: 84
End: 2019-02-20
Payer: MEDICARE

## 2019-02-21 ENCOUNTER — APPOINTMENT (OUTPATIENT)
Dept: PHYSICAL THERAPY | Facility: CLINIC | Age: 84
End: 2019-02-21
Payer: MEDICARE

## 2019-02-26 ENCOUNTER — OFFICE VISIT (OUTPATIENT)
Dept: PHYSICAL THERAPY | Facility: CLINIC | Age: 84
End: 2019-02-26
Payer: MEDICARE

## 2019-02-26 DIAGNOSIS — I89.0 LYMPHEDEMA: Primary | ICD-10-CM

## 2019-02-26 PROCEDURE — 97140 MANUAL THERAPY 1/> REGIONS: CPT | Performed by: PHYSICAL THERAPIST

## 2019-02-26 NOTE — LETTER
2019    Seven Ochoa DPM  [facility] [address]    Patient: Mayelin Dennis   YOB: 1935   Date of Visit: 2019     Encounter Diagnosis     ICD-10-CM    1  Lymphedema I89 0        Dear Dr Klever Parra:    Please review the attached Plan of Care from [facility] recent visit  Please verify that you agree therapy should continue by signing the attached document and sending it back to our office  If you have any questions or concerns, please don't hesitate to call  Sincerely,    Pita Patel PT      Referring Provider:      I certify that I have read the below Plan of Care and certify the need for these services furnished under this plan of treatment while under my care  Seven Ochoa DPM  [address] [place] [address]: [phone]          PT Re-Evaluation     Today's date: 2019  Patient name: Mayelin Dennis  : 1935  MRN: [id number]  Referring provider: Kandy Sky DPM  Dx:   Encounter Diagnosis     ICD-10-CM    1  Lymphedema I89 0                   Assessment  Assessment details: B LE edema is noted w/ severe skin deterioration R lower leg and marked hemosiderin staining B  Skilled PT is necessary to address including full CDT protocol to involve MLD, compression wraps and bandaging as appropriate w/ education regarding skin care and daily application of lotion including Rx cream       19 Update:  Pt advancing well in POC w/ marked vol reduction L LE including improved skin health and reduced fibrosis but persistent edema worse in R LE w/ slow healing of skin but no longer weeping and slight reduction in fibrosis noted  Pt maintains LE compression wraps daily    Cont;d skilled PT it necessary to further reduced vol B LE, especially R along w/ reducing fibrosis and improving overall skin health to minimize infection risk and promote transition to phase 2 CDT protocol - self maintenance  Other impairment: B LE edema  Functional limitations: Limited amb potential and poor balanceUnderstanding of Dx/Px/POC: good   Prognosis: good    Goals  ST  Reduce B LE PN < 3/10 w/ all activity within 3 wks - Partially met  2  Reduce B LE girth measurements >1 cm within 3 wks - Partially met  LT  Reduce B LE girth measurements >3 cm within 6 wks - Not met  2  Reduce PN <1/10 within 6 wks - Not met  3  I in phase 2 CDT protocol within 6 wks -Partially met    Plan  Patient would benefit from: skilled physical therapy  Referral necessary: Yes  Planned therapy interventions: manual therapy, massage and compression  Frequency: 3x week  Duration in visits: 15  Duration in weeks: 5  Plan of Care beginning date: 2019  Plan of Care expiration date: 2019  Treatment plan discussed with: patient        Subjective Evaluation    History of Present Illness  Mechanism of injury: Pt presents to PT w/ c/o B LE edema, R>L LE w/ sx's fluctuating but worse w/o compression  Pt had initially been seen for skilled PT to address utilizing CDT protocol however had been placed on hold and then d/c following a bout of cellulitis  Pt now returns admitting overall better management of legs but sx's more recently worsened and at times affecting his balance , amb, and activity potential   Pt utilizes a variety of Rx creams for legs and maintains wraps  19 Update:  Pt admits overall doing better but plagued by cont'd knee PN, R>L and also noticing that skin integrity R lower leg remains impaired             Recurrent probem    Quality of life: fair    Pain  Current pain ratin  At best pain ratin  At worst pain ratin  Quality: dull ache, discomfort, cramping, tight and squeezing  Relieving factors: rest and support  Aggravating factors: walking    Treatments  Previous treatment: physical therapy  Patient Goals  Patient goals for therapy: decreased edema, independence with ADLs/IADLs, return to sport/leisure activities and improved balance          Objective     General Comments: Ankle/Foot Comments   Physical assessment: R LE moderate scaling and dryness of skin w/ mod edema  Palpation: Mild ttp R>L LE  Skin Mobility: L LE fair, R LE Poor w/ mod to fibrosis  Skin color: + hemosiderin staining B LE distal 1/2 of B lower legs  Pitting: +1  Wound presence: R LE multiple surface wounds now closed w/ no exudate present but cont'd dry scaly/cracking skin  Wound size: see above  Wound color: see above  Stemmer's Sign: + B LE  Gait assessment: Antalgic, fair balance, slight festination and reduced MICHELLE     Transfer status:  I w/ B UE A  Ability to don/doff clothing/garments: Limited            Flowsheet Rows      Most Recent Value   girth measurments   Extremity   Lower extremity   LE Girth Measurments  Forefoot, Ankle Figure 8, Malleoli, M+10cm, M+20cm, M+30cm, M+40cm, M+50cm   Forefoot   L Forefoot Initial girth  27 cm   L Forefoot Updated Girth  26 25 cm   L Forefoot Girth Calculation  -0 75 cm   R Forefoot Initial girth  27 5   R Forefoot Updated Girth  27 9   L Forefoot girth calculation  0 4   Ankle Figure 8   L Ankle Figure 8 Initial girth  65 cm   L Ankle Figure 8 Updated Girth  65 cm   L Ankle Figure 8 Girth Calculation  0 cm   R Ankle Figure 8 Initial girth  64 cm   R Ankle Figure 8 Updated Girth  65 cm   R Ankle Figure 8 Girth Calculation  1 cm   Malleoli   L Malleoli Initial girth  34 cm   L Malleoli Updated girth  32 5 cm   L Malleoli Girth Calculation  -1 5 cm   R Malleoli Initial girth  31 5 cm   R Malleoli Updated girth  32 cm   R Malleoli Girth Calculation  0 5 cm   M+10cm    L M+10cm Initial girth  31 cm   L M+10cm Updated girth  30 1 cm   L M+10cm Girth Calculation  -0 9 cm   R M+10cm Initial girth  29 5 cm   R M+10cm Updated girth  32 cm   R M+10cm Girth Calculation  2 5 cm   M+20cm    L M+20cm Initial girth  39 5 cm   L M+20cm Updated girth  38 75 cm   L M+20cm Girth Calculation  -0 75 cm   R M+20cm Initial girth  38 75 cm   R M+20cm Updated girth  40 25 cm   R M+20cm Girth Calculation  1 5 cm   M+30cm    L M+30cm Initial girth  45 cm   L M+30cm Updated girth  44 75 cm   L M+30cm Girth Calculation  -0 25 cm   R M+30cm Initial girth  43 5 cm   R M+30cm Updated girth  44 cm   R M+30cm Girth Calculation  0 5 cm   M+40cm    L M+40cm Initial girth  46 cm   L M+40cm Updated girth  46 cm   L M+40cm Girth Calculation  0 cm   R M+40cm Initial girth  48 cm   R M+40cm Updated girth  47 75 cm   R M+40cm Girth Calculation  -0 25 cm   M+50cm    L M+50cm Initial girth  51 5 cm   L M+50cm Updated girth  51 25 cm   L M+50cm Girth Calculation  -0 25 cm   R M+50cm Initial girth  52 25 cm   R M+50cm Updated girth  52 cm   R M+50cm Girth Calculation  -0 25 cm              Precautions: NKA, B LE Lymphedema    Daily Treatment Diary     Manual  2-26            MLD B LE jph            B LE ready wraps + compression bandage [facility]                                       x60'                Exercise Diary                                                                                                                                                                                                                                                                                      Modalities

## 2019-02-26 NOTE — PROGRESS NOTES
PT Re-Evaluation     Today's date: 2019  Patient name: Nabila Ledezma  : 1935  MRN: [id number]  Referring provider: Izzy Guerin DPM  Dx:   Encounter Diagnosis     ICD-10-CM    1  Lymphedema I89 0                   Assessment  Assessment details: B LE edema is noted w/ severe skin deterioration R lower leg and marked hemosiderin staining B  Skilled PT is necessary to address including full CDT protocol to involve MLD, compression wraps and bandaging as appropriate w/ education regarding skin care and daily application of lotion including Rx cream       19 Update:  Pt advancing well in POC w/ marked vol reduction L LE including improved skin health and reduced fibrosis but persistent edema worse in R LE w/ slow healing of skin but no longer weeping and slight reduction in fibrosis noted  Pt maintains LE compression wraps daily  Cont;d skilled PT it necessary to further reduced vol B LE, especially R along w/ reducing fibrosis and improving overall skin health to minimize infection risk and promote transition to phase 2 CDT protocol - self maintenance  Other impairment: B LE edema  Functional limitations: Limited amb potential and poor balanceUnderstanding of Dx/Px/POC: good   Prognosis: good    Goals  ST  Reduce B LE PN < 3/10 w/ all activity within 3 wks - Partially met  2  Reduce B LE girth measurements >1 cm within 3 wks - Partially met  LT  Reduce B LE girth measurements >3 cm within 6 wks - Not met  2  Reduce PN <1/10 within 6 wks - Not met  3   I in phase 2 CDT protocol within 6 wks -Partially met    Plan  Patient would benefit from: skilled physical therapy  Referral necessary: Yes  Planned therapy interventions: manual therapy, massage and compression  Frequency: 3x week  Duration in visits: 15  Duration in weeks: 5  Plan of Care beginning date: 2019  Plan of Care expiration date: 2019  Treatment plan discussed with: patient        Subjective Evaluation    History of Present Illness  Mechanism of injury: Pt presents to PT w/ c/o B LE edema, R>L LE w/ sx's fluctuating but worse w/o compression  Pt had initially been seen for skilled PT to address utilizing CDT protocol however had been placed on hold and then d/c following a bout of cellulitis  Pt now returns admitting overall better management of legs but sx's more recently worsened and at times affecting his balance , amb, and activity potential   Pt utilizes a variety of Rx creams for legs and maintains wraps  19 Update:  Pt admits overall doing better but plagued by cont'd knee PN, R>L and also noticing that skin integrity R lower leg remains impaired  Recurrent probem    Quality of life: fair    Pain  Current pain ratin  At best pain ratin  At worst pain ratin  Quality: dull ache, discomfort, cramping, tight and squeezing  Relieving factors: rest and support  Aggravating factors: walking    Treatments  Previous treatment: physical therapy  Patient Goals  Patient goals for therapy: decreased edema, independence with ADLs/IADLs, return to sport/leisure activities and improved balance          Objective     General Comments: Ankle/Foot Comments   Physical assessment: R LE moderate scaling and dryness of skin w/ mod edema  Palpation: Mild ttp R>L LE  Skin Mobility: L LE fair, R LE Poor w/ mod to fibrosis  Skin color: + hemosiderin staining B LE distal 1/2 of B lower legs  Pitting: +1  Wound presence: R LE multiple surface wounds now closed w/ no exudate present but cont'd dry scaly/cracking skin  Wound size: see above  Wound color: see above  Stemmer's Sign: + B LE  Gait assessment: Antalgic, fair balance, slight festination and reduced MICHELLE     Transfer status:  I w/ B UE A  Ability to don/doff clothing/garments: Limited            Flowsheet Rows      Most Recent Value   girth measurments   Extremity   Lower extremity   LE Girth Measurments  Forefoot, Ankle Figure 8, Malleoli, M+10cm, M+20cm, M+30cm, M+40cm, M+50cm   Forefoot   L Forefoot Initial girth  27 cm   L Forefoot Updated Girth  26 25 cm   L Forefoot Girth Calculation  -0 75 cm   R Forefoot Initial girth  27 5   R Forefoot Updated Girth  27 9   L Forefoot girth calculation  0 4   Ankle Figure 8   L Ankle Figure 8 Initial girth  65 cm   L Ankle Figure 8 Updated Girth  65 cm   L Ankle Figure 8 Girth Calculation  0 cm   R Ankle Figure 8 Initial girth  64 cm   R Ankle Figure 8 Updated Girth  65 cm   R Ankle Figure 8 Girth Calculation  1 cm   Malleoli   L Malleoli Initial girth  34 cm   L Malleoli Updated girth  32 5 cm   L Malleoli Girth Calculation  -1 5 cm   R Malleoli Initial girth  31 5 cm   R Malleoli Updated girth  32 cm   R Malleoli Girth Calculation  0 5 cm   M+10cm    L M+10cm Initial girth  31 cm   L M+10cm Updated girth  30 1 cm   L M+10cm Girth Calculation  -0 9 cm   R M+10cm Initial girth  29 5 cm   R M+10cm Updated girth  32 cm   R M+10cm Girth Calculation  2 5 cm   M+20cm    L M+20cm Initial girth  39 5 cm   L M+20cm Updated girth  38 75 cm   L M+20cm Girth Calculation  -0 75 cm   R M+20cm Initial girth  38 75 cm   R M+20cm Updated girth  40 25 cm   R M+20cm Girth Calculation  1 5 cm   M+30cm    L M+30cm Initial girth  45 cm   L M+30cm Updated girth  44 75 cm   L M+30cm Girth Calculation  -0 25 cm   R M+30cm Initial girth  43 5 cm   R M+30cm Updated girth  44 cm   R M+30cm Girth Calculation  0 5 cm   M+40cm    L M+40cm Initial girth  46 cm   L M+40cm Updated girth  46 cm   L M+40cm Girth Calculation  0 cm   R M+40cm Initial girth  48 cm   R M+40cm Updated girth  47 75 cm   R M+40cm Girth Calculation  -0 25 cm   M+50cm    L M+50cm Initial girth  51 5 cm   L M+50cm Updated girth  51 25 cm   L M+50cm Girth Calculation  -0 25 cm   R M+50cm Initial girth  52 25 cm   R M+50cm Updated girth  52 cm   R M+50cm Girth Calculation  -0 25 cm              Precautions: COURTNEY VIERA Lymphedema    Daily Treatment Diary     Manual 2-26            MLD B LE jph            B LE ready wraps + compression bandage [facility]                                       x60'                Exercise Diary                                                                                                                                                                                                                                                                                      Modalities

## 2019-02-27 ENCOUNTER — TRANSCRIBE ORDERS (OUTPATIENT)
Dept: PHYSICAL THERAPY | Facility: CLINIC | Age: 84
End: 2019-02-27

## 2019-02-27 DIAGNOSIS — I89.0 CHRONIC ACQUIRED LYMPHEDEMA: Primary | ICD-10-CM

## 2019-03-01 ENCOUNTER — OFFICE VISIT (OUTPATIENT)
Dept: PHYSICAL THERAPY | Facility: CLINIC | Age: 84
End: 2019-03-01
Payer: MEDICARE

## 2019-03-01 DIAGNOSIS — I89.0 LYMPHEDEMA: Primary | ICD-10-CM

## 2019-03-01 PROCEDURE — 97140 MANUAL THERAPY 1/> REGIONS: CPT | Performed by: PHYSICAL THERAPIST

## 2019-03-01 NOTE — PROGRESS NOTES
Daily Note     Today's date: 3/1/2019  Patient name: Bettie Stewart  : 1935  MRN: [id number]  Referring provider: Michelle Xiong DPM  Dx:   Encounter Diagnosis     ICD-10-CM    1  Lymphedema I89 0                   Subjective: Pt reports his legs just don't want to go this morning, he attributes this to the poor weather stating "every time a front comes thru this happens"  Pt does present w/ medicated cream this day to add to application w/ lotion  Objective: See treatment diary below    Assessment: Tolerated treatment well  Patient would benefit from continued PT  Improved skin integrity vs last visit noted R BETHANY, still concern regarding scaly dry skin and broken skin, however appears to be improving w/ tx  Plan: Continue per plan of care       Precautions: COURTNEY VIERA Lymphedema    Daily Treatment Diary     Manual  2-11 2-19 3-1          MLNATHANAEL SIMS jph jph jpsantosh SIMS ready wraps + compression bandage Hay Ching' x55' x55'              Exercise Diary                                                                                                                                                                                                                                                                                      Modalities

## 2019-03-05 ENCOUNTER — OFFICE VISIT (OUTPATIENT)
Dept: PHYSICAL THERAPY | Facility: CLINIC | Age: 84
End: 2019-03-05
Payer: MEDICARE

## 2019-03-05 DIAGNOSIS — I89.0 LYMPHEDEMA: Primary | ICD-10-CM

## 2019-03-05 PROCEDURE — 97140 MANUAL THERAPY 1/> REGIONS: CPT | Performed by: PHYSICAL THERAPIST

## 2019-03-05 NOTE — PROGRESS NOTES
Daily Note     Today's date: 3/5/2019  Patient name: Hazel Montoya  : 1935  MRN: [id number]  Referring provider: Johnny Devries DPM  Dx:   Encounter Diagnosis     ICD-10-CM    1  Lymphedema I89 0                   Subjective: Pt offers minimal c/o this day, sees skin less irritated this AM  Trialing medical marijuana to address PN sx's  Objective: See treatment diary below    Assessment: Tolerated treatment well  Patient would benefit from continued PT  Improved skin integrity however slight weeping noted from R foot 2nd digit  Will monitor  Plan: Continue per plan of care       Precautions: COURTNEY VIERA Lymphedema    Daily Treatment Diary     Manual  2-11 2-19 3-1 3-5         MLD B BETHANY jph jph jpsantosh asif         B LE ready wraps + compression bandage Blaine Irvin' x55' x55' x50'             Exercise Diary                                                                                                                                                                                                                                                                                      Modalities

## 2019-03-07 ENCOUNTER — OFFICE VISIT (OUTPATIENT)
Dept: PHYSICAL THERAPY | Facility: CLINIC | Age: 84
End: 2019-03-07
Payer: MEDICARE

## 2019-03-07 DIAGNOSIS — I89.0 LYMPHEDEMA: Primary | ICD-10-CM

## 2019-03-07 PROCEDURE — 97140 MANUAL THERAPY 1/> REGIONS: CPT | Performed by: PHYSICAL THERAPIST

## 2019-03-07 NOTE — PROGRESS NOTES
Daily Note     Today's date: 3/7/2019  Patient name: Paul Blum  : 1935  MRN: [id number]  Referring provider: Christopher Rose DPM  Dx:   Encounter Diagnosis     ICD-10-CM    1  Lymphedema I89 0                   Subjective: Pt offers minimal c/o this day, sees skin less irritated this AM  Pt reports no change in PN sx's despite use of medical marijuana  Cont'd ache in B knees cited  Objective: See treatment diary below    Assessment: Tolerated treatment well  Patient would benefit from continued PT  Concern regarding clear exudate/weeping from R foot/toes (digits 2-4) despite overall vol reduction thru lower leg and improved skin integrity  Pt to see podiatrist next wk and plans to discuss  Plan: Continue per plan of care       Precautions: COURTNEY VIERA LE Lymphedema    Daily Treatment Diary     Manual  2-11 2-19 3-1 3-5 3-7        MLD B LE jph jph jph jph juniorh        B LE ready wraps + compression bandage laya Leal' x55' x55' x50' x55'            Exercise Diary                                                                                                                                                                                                                                                                                      Modalities

## 2019-03-11 ENCOUNTER — OFFICE VISIT (OUTPATIENT)
Dept: PHYSICAL THERAPY | Facility: CLINIC | Age: 84
End: 2019-03-11
Payer: MEDICARE

## 2019-03-11 DIAGNOSIS — I89.0 LYMPHEDEMA: Primary | ICD-10-CM

## 2019-03-11 PROCEDURE — 97140 MANUAL THERAPY 1/> REGIONS: CPT | Performed by: PHYSICAL THERAPIST

## 2019-03-11 NOTE — PROGRESS NOTES
Daily Note     Today's date: 3/11/2019  Patient name: Marcy Lassiter  : 1935  MRN: [id number]  Referring provider: Pili Vanessa DPM  Dx:   Encounter Diagnosis     ICD-10-CM    1  Lymphedema I89 0                   Subjective: Pt presents admitting podiatry appt tomorrow and reports that his toes don't seem to be oozing today  Objective: See treatment diary below    Assessment: Tolerated treatment well  Patient would benefit from continued PT  No active exudate from R toes this day  Legs cont to improve, cont to monitor and consider potential d/c next wk  Plan: Continue per plan of care       Precautions: NKA, COURTNEY LE Lymphedema    Daily Treatment Diary     Manual  2-11 2-19 3-1 3-5 3-7 3-11       MLD B BETHANY jph jph jph jph jp jp       B LE ready wraps + compression bandage jp Neeraj Zayas' x55' x55' x50' x55' x55'           Exercise Diary                                                                                                                                                                                                                                                                                      Modalities

## 2019-03-12 ENCOUNTER — APPOINTMENT (OUTPATIENT)
Dept: PHYSICAL THERAPY | Facility: CLINIC | Age: 84
End: 2019-03-12
Payer: MEDICARE

## 2019-03-13 ENCOUNTER — APPOINTMENT (OUTPATIENT)
Dept: PHYSICAL THERAPY | Facility: CLINIC | Age: 84
End: 2019-03-13
Payer: MEDICARE

## 2019-03-14 ENCOUNTER — OFFICE VISIT (OUTPATIENT)
Dept: PHYSICAL THERAPY | Facility: CLINIC | Age: 84
End: 2019-03-14
Payer: MEDICARE

## 2019-03-14 DIAGNOSIS — I89.0 LYMPHEDEMA: Primary | ICD-10-CM

## 2019-03-14 PROCEDURE — 97140 MANUAL THERAPY 1/> REGIONS: CPT | Performed by: PHYSICAL THERAPIST

## 2019-03-14 NOTE — PROGRESS NOTES
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  Objective: See treatment diary below    Assessment: Tolerated treatment well  Patient would benefit from continued PT  Cont'd vol reduction and no exudate, will cont to monitor  Plan: Continue per plan of care       Precautions: COURTNEY VIERA Lymphedema    Daily Treatment Diary     Manual  2-11 2-19 3-1 3-5 3-7 3-11 3-14      MLD B LE jph jph jph jph jp jp junior      COURTNEY SIMS ready wraps + compression bandage laya Blanco                                 x50' x55' x55' x50' x55' x55' x55          Exercise Diary                                                                                                                                                                                                                                                                                      Modalities

## 2019-03-19 ENCOUNTER — OFFICE VISIT (OUTPATIENT)
Dept: PHYSICAL THERAPY | Facility: CLINIC | Age: 84
End: 2019-03-19
Payer: MEDICARE

## 2019-03-19 DIAGNOSIS — I89.0 LYMPHEDEMA: Primary | ICD-10-CM

## 2019-03-19 PROCEDURE — 97140 MANUAL THERAPY 1/> REGIONS: CPT | Performed by: PHYSICAL THERAPIST

## 2019-03-19 NOTE — PROGRESS NOTES
Daily Note     Today's date: 3/19/2019  Patient name: Britney Delarosa  : 1935  MRN: [id number]  Referring provider: Leslie Boyer DPM  Dx:   Encounter Diagnosis     ICD-10-CM    1  Lymphedema I89 0                   Subjective: Pt reports his wife seems to think his legs look much better than before initiating tx  Objective: See treatment diary below    Assessment: Tolerated treatment well  Patient would benefit from continued PT  Vol reduction remains reduced, but also skin integrity cont to improve w/ pt to be considered for trial to phase 2 CDT protocol after next visit with hold PT x 1 wk then f/u to re-measure  Plan: Continue per plan of care       Precautions: COURTNEY VIERA Lymphedema    Daily Treatment Diary     Manual  2-11 2-19 3-1 3-5 3-7 3-11 3-14 3-19     MLD B BETHANY jph jph jph jph jph jph jph jph     COURTNEY SIMS ready wraps + compression bandage jph jph jph Warden Saeed' x55' x55' x50' x55' x55' x55 x55'         Exercise Diary                                                                                                                                                                                                                                                                                      Modalities

## 2019-03-21 ENCOUNTER — OFFICE VISIT (OUTPATIENT)
Dept: PHYSICAL THERAPY | Facility: CLINIC | Age: 84
End: 2019-03-21
Payer: MEDICARE

## 2019-03-21 DIAGNOSIS — I89.0 LYMPHEDEMA: Primary | ICD-10-CM

## 2019-03-21 PROCEDURE — 97140 MANUAL THERAPY 1/> REGIONS: CPT | Performed by: PHYSICAL THERAPIST

## 2019-03-21 NOTE — PROGRESS NOTES
Daily Note     Today's date: 3/21/2019  Patient name: Jorge Sheridan  : 1935  MRN: [id number]  Referring provider: Diana Lazcano DPM  Dx:   Encounter Diagnosis     ICD-10-CM    1  Lymphedema I89 0                   Subjective: Pt admits doing well, agrees to trial to home x 1 wk and f/u , potential d/c after pending response  Objective: See treatment diary below    Assessment: Tolerated treatment well  Patient would benefit from continued PT  Will re-measure NV, potential d/c, maintaining legs well at present  Plan: Continue per plan of care       Precautions: COURTNEY VIERA Lymphedema    Daily Treatment Diary     Manual  2-11 2-19 3-1 3-5 3-7 3-11 3-14 3-19 3-21    MLD B LE jph jph jph jph jph jph jph jph jph    B LE ready wraps + compression bandage jph jph jph jph Ermalinda Erps' x55' x55' x50' x55' x55' x55 x55' x55'        Exercise Diary                                                                                                                                                                                                                                                                                      Modalities

## 2019-03-22 ENCOUNTER — APPOINTMENT (OUTPATIENT)
Dept: PHYSICAL THERAPY | Facility: CLINIC | Age: 84
End: 2019-03-22
Payer: MEDICARE

## 2019-04-01 ENCOUNTER — APPOINTMENT (OUTPATIENT)
Dept: PHYSICAL THERAPY | Facility: CLINIC | Age: 84
End: 2019-04-01
Payer: MEDICARE

## 2019-04-02 ENCOUNTER — OFFICE VISIT (OUTPATIENT)
Dept: PHYSICAL THERAPY | Facility: CLINIC | Age: 84
End: 2019-04-02
Payer: MEDICARE

## 2019-04-02 DIAGNOSIS — I89.0 LYMPHEDEMA: Primary | ICD-10-CM

## 2019-04-02 PROCEDURE — 97140 MANUAL THERAPY 1/> REGIONS: CPT | Performed by: PHYSICAL THERAPIST

## 2019-04-03 ENCOUNTER — TRANSCRIBE ORDERS (OUTPATIENT)
Dept: PHYSICAL THERAPY | Facility: CLINIC | Age: 84
End: 2019-04-03

## 2019-04-03 DIAGNOSIS — I89.0 CHRONIC ACQUIRED LYMPHEDEMA: Primary | ICD-10-CM

## 2019-05-06 ENCOUNTER — OFFICE VISIT (OUTPATIENT)
Dept: PHYSICAL THERAPY | Facility: CLINIC | Age: 84
End: 2019-05-06
Payer: MEDICARE

## 2019-05-06 ENCOUNTER — TRANSCRIBE ORDERS (OUTPATIENT)
Dept: PHYSICAL THERAPY | Facility: CLINIC | Age: 84
End: 2019-05-06

## 2019-05-06 DIAGNOSIS — I89.0 OBLITERATION OF LYMPHATIC VESSEL: Primary | ICD-10-CM

## 2019-05-06 DIAGNOSIS — I89.0 LYMPHEDEMA: Primary | ICD-10-CM

## 2019-05-06 PROCEDURE — 97164 PT RE-EVAL EST PLAN CARE: CPT | Performed by: PHYSICAL THERAPIST

## 2019-08-19 ENCOUNTER — TRANSCRIBE ORDERS (OUTPATIENT)
Dept: PHYSICAL THERAPY | Facility: CLINIC | Age: 84
End: 2019-08-19

## 2019-08-19 ENCOUNTER — EVALUATION (OUTPATIENT)
Dept: PHYSICAL THERAPY | Facility: CLINIC | Age: 84
End: 2019-08-19
Payer: MEDICARE

## 2019-08-19 ENCOUNTER — APPOINTMENT (OUTPATIENT)
Dept: PHYSICAL THERAPY | Facility: CLINIC | Age: 84
End: 2019-08-19
Payer: MEDICARE

## 2019-08-19 DIAGNOSIS — I89.0 LYMPHEDEMA: Primary | ICD-10-CM

## 2019-08-19 DIAGNOSIS — I89.0 CHRONIC ACQUIRED LYMPHEDEMA: Primary | ICD-10-CM

## 2019-08-19 PROCEDURE — 97163 PT EVAL HIGH COMPLEX 45 MIN: CPT | Performed by: PHYSICAL THERAPIST

## 2019-08-19 NOTE — PROGRESS NOTES
PT Evaluation     Today's date: 2019  Patient name: Abran Marcus  : 1935  MRN: [id number]  Referring provider: Cece Burleson DPM  Dx:   Encounter Diagnosis     ICD-10-CM    1  Lymphedema I89 0                   Assessment  Assessment details: B LE edema is noted w/ severe skin deterioration R lower leg and marked hemosiderin staining B  Skilled PT is necessary to address including full CDT protocol to involve MLD, compression wraps and bandaging as appropriate w/ education regarding skin care and daily application of lotion including Rx cream       19 Update:  Pt demonstrates cont'd edema B LE but better under control w/ overall reduced skin fibrosis and improved control of volume B LE w/ use of B LE ready wraps daily  Of primary concern is nickel size superficial R LE medial mid calf wound and moderate fibrosis remaining R lower leg thru ankle and dorsum of foot  Pt instructed in self management/phase 2 CDT protocol and will maintain x 1 month w/ f/u to re-measure and determine potential need for cont'd care or d/c to phase 2 at that time  19 Update:  Pt's B LE overall vol reduced since last measurements w/ reduced girth B LE realized, managing skin well and B LE compression garments daily  No indication for full CDT at this time in favor of phase 2 - self management to continue w/ 3 mo f/u unless otherwise needed  19 Update:  Pt seen this day to address significantly worsened R LE edema w/ negative skin changes including + stemmer's, severe fibrosis, and presence of wounds along R foot dorsal met-heads  Pt requires new ready wraps, which he has ordered and is awaiting arrival   Skilled care should be re-initiated including modified MLD of B LE and cont'd use of wraps  Bandaging may also be considered to address     Other impairment: B LE edema  Functional limitations: Limited amb potential and poor balanceUnderstanding of Dx/Px/POC: good   Prognosis: good    Goals  ST  Reduce B LE PN < 3/10 w/ all activity within 3 wks   2  Reduce B LE girth measurements >1 cm within 3 wks   LT  Reduce B LE girth measurements >3 cm within 6 wks   2  Reduce PN <1/10 within 6 wks  3  I in phase 2 CDT protocol within 6 wks     Plan  Patient would benefit from: skilled physical therapy  Referral necessary: Yes  Planned therapy interventions: manual therapy, massage and compression  Frequency: 2x week  Duration in visits: 12  Duration in weeks: 6  Plan of Care beginning date: 2019  Plan of Care expiration date: 2019  Treatment plan discussed with: patient        Subjective Evaluation    History of Present Illness  Mechanism of injury: Pt presents to PT w/ c/o B LE edema, R>L LE w/ sx's fluctuating but worse w/o compression  Pt had initially been seen for skilled PT to address utilizing CDT protocol however had been placed on hold and then d/c following a bout of cellulitis  Pt now returns admitting overall better management of legs but sx's more recently worsened and at times affecting his balance , amb, and activity potential   Pt utilizes a variety of Rx creams for legs and maintains wraps  19 Update:  Pt admits overall doing better but plagued by cont'd knee PN, R>L and also noticing that skin integrity R lower leg remains impaired  19 Update:  Pt was on hold x 1 wk, reports maintaining wraps all day, daily because he has noticed w/o, his legs swell up more  Pt agrees to hold x 1 month then f/u for re-measurement  Will f/u sooner if sx's worsen in between  19:  Pt admits color changes that vary in legs but overall doing well, denies sig PN, admits edema fluctuates and maintains wraps daily  Pt also maintains lotion daily  19: Pt requests visit this day citing his R leg "blew up" reporting a large increase in edema of R LE and also weeping along dorsum of R foot prevalent along met-heads    Pt expresses frustration w/ current condition of legs, admits he has ordered new ready wraps, awaiting arrival from   Pt maintains current wraps daily  Recurrent probem    Quality of life: fair    Pain  Current pain ratin  At best pain ratin  At worst pain rating: 3  Quality: dull ache, discomfort, cramping, tight and squeezing  Relieving factors: rest and support  Aggravating factors: walking    Treatments  Previous treatment: physical therapy  Patient Goals  Patient goals for therapy: decreased edema, independence with ADLs/IADLs, return to sport/leisure activities and improved balance          Objective     General Comments: Ankle/Foot Comments   Physical assessment: R LE severe scaling and dryness of skin t/o R lower leg and into R foot  Palpation: Mild ttp R>L LE  Skin Mobility: Severe R LE fibrosis  Skin color: + hemosiderin staining B LE distal 1/2 of B lower legs  Pitting: +1  Wound presence: R LE small (nickel size) surface wound along medial mid calf Digits 2-4 closed, no exudate wounds along dorsum of met-heads  Wound size: see above  Wound color: see above  Stemmer's Sign: + B LE  Gait assessment: Antalgic, fair balance, slight festination and good MICHELLE     Transfer status:  I w/ B UE A  Ability to don/doff clothing/garments: Min A         Flowsheet Rows      Most Recent Value   girth measurments   Extremity   Lower extremity   LE Girth Measurments  Forefoot, Ankle Figure 8, Malleoli, M+10cm, M+20cm, M+30cm, M+40cm, M+50cm   Forefoot   L Forefoot Initial girth  27 cm   L Forefoot Updated Girth  28 75 cm   L Forefoot Girth Calculation  1 75 cm   R Forefoot Initial girth  27 5   R Forefoot Updated Girth  28 25   L Forefoot girth calculation  0 75   Ankle Figure 8   L Ankle Figure 8 Initial girth  65 cm   L Ankle Figure 8 Updated Girth  65 cm   L Ankle Figure 8 Girth Calculation  0 cm   R Ankle Figure 8 Initial girth  64 cm   R Ankle Figure 8 Updated Girth  63 5 cm   R Ankle Figure 8 Girth Calculation  -0 5 cm   Malleoli   L Malleoli Initial girth  34 cm   L Malleoli Updated girth  32 cm   L Malleoli Girth Calculation  -2 cm   R Malleoli Initial girth  31 5 cm   R Malleoli Updated girth  28 5 cm   R Malleoli Girth Calculation  -3 cm   M+10cm    L M+10cm Initial girth  31 cm   L M+10cm Updated girth  35 25 cm   L M+10cm Girth Calculation  4 25 cm   R M+10cm Initial girth  29 5 cm   R M+10cm Updated girth  28 25 cm   R M+10cm Girth Calculation  -1 25 cm   M+20cm    L M+20cm Initial girth  39 5 cm   L M+20cm Updated girth  47 25 cm   L M+20cm Girth Calculation  7 75 cm   R M+20cm Initial girth  38 75 cm   R M+20cm Updated girth  38 5 cm   R M+20cm Girth Calculation  -0 25 cm   M+30cm    L M+30cm Initial girth  45 cm   L M+30cm Updated girth  50 5 cm   L M+30cm Girth Calculation  5 5 cm   R M+30cm Initial girth  43 5 cm   R M+30cm Updated girth  43 5 cm   R M+30cm Girth Calculation  0 cm   M+40cm    L M+40cm Initial girth  46 cm   L M+40cm Updated girth  46 5 cm   L M+40cm Girth Calculation  0 5 cm   R M+40cm Initial girth  48 cm   R M+40cm Updated girth  46 cm   R M+40cm Girth Calculation  -2 cm   M+50cm    L M+50cm Initial girth  51 5 cm   L M+50cm Updated girth  52 5 cm   L M+50cm Girth Calculation  1 cm   R M+50cm Initial girth  52 25 cm   R M+50cm Updated girth  51 5 cm   R M+50cm Girth Calculation  -0 75 cm                       Precautions: NKA, B LE Lymphedema    Daily Treatment Diary     Manual  8-19            Modified MLD R LE jph            B LE ready wraps + compression bandage R LE jph                                       x25'                Exercise Diary                                                                                                                                                                                                                                                                                      Modalities

## 2019-08-19 NOTE — LETTER
2019    Mu Kirk DPM  [facility] [address]    Patient: Katrina Zaldivar   YOB: 1935   Date of Visit: 2019     Encounter Diagnosis     ICD-10-CM    1  Lymphedema I89 0        Dear Dr Barbara Freeman: Thank you for your recent referral of Katrina Zaldivar  Please review the attached evaluation summary from Steve's recent visit  Please verify that you agree with the plan of care by signing the attached order  If you have any questions or concerns, please do not hesitate to call  I sincerely appreciate the opportunity to share in the care of one of your patients and hope to have another opportunity to work with you in the near future  Sincerely,    Siva Loza, PT      Referring Provider:      I certify that I have read the below Plan of Care and certify the need for these services furnished under this plan of treatment while under my care  Mu Kirk DPM  [address] [facility]  VIA Facsimile: [phone]          PT Evaluation     Today's date: 2019  Patient name: Katrina Zaldivar  : 1935  MRN: [id number]  Referring provider: Marium Robison DPM  Dx:   Encounter Diagnosis     ICD-10-CM    1  Lymphedema I89 0                   Assessment  Assessment details: B LE edema is noted w/ severe skin deterioration R lower leg and marked hemosiderin staining B  Skilled PT is necessary to address including full CDT protocol to involve MLD, compression wraps and bandaging as appropriate w/ education regarding skin care and daily application of lotion including Rx cream       19 Update:  Pt demonstrates cont'd edema B LE but better under control w/ overall reduced skin fibrosis and improved control of volume B LE w/ use of B LE ready wraps daily   Of primary concern is nickel size superficial R LE medial mid calf wound and moderate fibrosis remaining R lower leg thru ankle and dorsum of foot  Pt instructed in self management/phase 2 CDT protocol and will maintain x 1 month w/ f/u to re-measure and determine potential need for cont'd care or d/c to phase 2 at that time  19 Update:  Pt's B LE overall vol reduced since last measurements w/ reduced girth B LE realized, managing skin well and B LE compression garments daily  No indication for full CDT at this time in favor of phase 2 - self management to continue w/ 3 mo f/u unless otherwise needed  19 Update:  Pt seen this day to address significantly worsened R LE edema w/ negative skin changes including + stemmer's, severe fibrosis, and presence of wounds along R foot dorsal met-heads  Pt requires new ready wraps, which he has ordered and is awaiting arrival   Skilled care should be re-initiated including modified MLD of B LE and cont'd use of wraps  Bandaging may also be considered to address  Other impairment: B LE edema  Functional limitations: Limited amb potential and poor balanceUnderstanding of Dx/Px/POC: good   Prognosis: good    Goals  ST  Reduce B LE PN < 3/10 w/ all activity within 3 wks   2  Reduce B LE girth measurements >1 cm within 3 wks   LT  Reduce B LE girth measurements >3 cm within 6 wks   2  Reduce PN <1/10 within 6 wks  3  I in phase 2 CDT protocol within 6 wks     Plan  Patient would benefit from: skilled physical therapy  Referral necessary: Yes  Planned therapy interventions: manual therapy, massage and compression  Frequency: 2x week  Duration in visits: 12  Duration in weeks: 6  Plan of Care beginning date: 2019  Plan of Care expiration date: 2019  Treatment plan discussed with: patient        Subjective Evaluation    History of Present Illness  Mechanism of injury: Pt presents to PT w/ c/o B LE edema, R>L LE w/ sx's fluctuating but worse w/o compression    Pt had initially been seen for skilled PT to address utilizing CDT protocol however had been placed on hold and then d/c following a bout of cellulitis  Pt now returns admitting overall better management of legs but sx's more recently worsened and at times affecting his balance , amb, and activity potential   Pt utilizes a variety of Rx creams for legs and maintains wraps  19 Update:  Pt admits overall doing better but plagued by cont'd knee PN, R>L and also noticing that skin integrity R lower leg remains impaired  19 Update:  Pt was on hold x 1 wk, reports maintaining wraps all day, daily because he has noticed w/o, his legs swell up more  Pt agrees to hold x 1 month then f/u for re-measurement  Will f/u sooner if sx's worsen in between  19:  Pt admits color changes that vary in legs but overall doing well, denies sig PN, admits edema fluctuates and maintains wraps daily  Pt also maintains lotion daily  19: Pt requests visit this day citing his R leg "blew up" reporting a large increase in edema of R LE and also weeping along dorsum of R foot prevalent along met-heads  Pt expresses frustration w/ current condition of legs, admits he has ordered new ready wraps, awaiting arrival from   Pt maintains current wraps daily  Recurrent probem    Quality of life: fair    Pain  Current pain ratin  At best pain ratin  At worst pain rating: 3  Quality: dull ache, discomfort, cramping, tight and squeezing  Relieving factors: rest and support  Aggravating factors: walking    Treatments  Previous treatment: physical therapy  Patient Goals  Patient goals for therapy: decreased edema, independence with ADLs/IADLs, return to sport/leisure activities and improved balance          Objective     General Comments:       Ankle/Foot Comments   Physical assessment: R LE severe scaling and dryness of skin t/o R lower leg and into R foot  Palpation: Mild ttp R>L LE  Skin Mobility: Severe R LE fibrosis  Skin color: + hemosiderin staining B LE distal 1/2 of B lower legs  Pitting: +1  Wound presence: R LE small (nickel size) surface wound along medial mid calf Digits 2-4 closed, no exudate wounds along dorsum of met-heads  Wound size: see above  Wound color: see above  Stemmer's Sign: + B LE  Gait assessment: Antalgic, fair balance, slight festination and good MICHELLE     Transfer status:  I w/ B UE A  Ability to don/doff clothing/garments: Min A         Flowsheet Rows      Most Recent Value   girth measurments   Extremity   Lower extremity   LE Girth Measurments  Forefoot, Ankle Figure 8, Malleoli, M+10cm, M+20cm, M+30cm, M+40cm, M+50cm   Forefoot   L Forefoot Initial girth  27 cm   L Forefoot Updated Girth  28 75 cm   L Forefoot Girth Calculation  1 75 cm   R Forefoot Initial girth  27 5   R Forefoot Updated Girth  28 25   L Forefoot girth calculation  0 75   Ankle Figure 8   L Ankle Figure 8 Initial girth  65 cm   L Ankle Figure 8 Updated Girth  65 cm   L Ankle Figure 8 Girth Calculation  0 cm   R Ankle Figure 8 Initial girth  64 cm   R Ankle Figure 8 Updated Girth  63 5 cm   R Ankle Figure 8 Girth Calculation  -0 5 cm   Malleoli   L Malleoli Initial girth  34 cm   L Malleoli Updated girth  32 cm   L Malleoli Girth Calculation  -2 cm   R Malleoli Initial girth  31 5 cm   R Malleoli Updated girth  28 5 cm   R Malleoli Girth Calculation  -3 cm   M+10cm    L M+10cm Initial girth  31 cm   L M+10cm Updated girth  35 25 cm   L M+10cm Girth Calculation  4 25 cm   R M+10cm Initial girth  29 5 cm   R M+10cm Updated girth  28 25 cm   R M+10cm Girth Calculation  -1 25 cm   M+20cm    L M+20cm Initial girth  39 5 cm   L M+20cm Updated girth  47 25 cm   L M+20cm Girth Calculation  7 75 cm   R M+20cm Initial girth  38 75 cm   R M+20cm Updated girth  38 5 cm   R M+20cm Girth Calculation  -0 25 cm   M+30cm    L M+30cm Initial girth  45 cm   L M+30cm Updated girth  50 5 cm   L M+30cm Girth Calculation  5 5 cm   R M+30cm Initial girth  43 5 cm   R M+30cm Updated girth  43 5 cm   R M+30cm Girth Calculation  0 cm M+40cm    L M+40cm Initial girth  46 cm   L M+40cm Updated girth  46 5 cm   L M+40cm Girth Calculation  0 5 cm   R M+40cm Initial girth  48 cm   R M+40cm Updated girth  46 cm   R M+40cm Girth Calculation  -2 cm   M+50cm    L M+50cm Initial girth  51 5 cm   L M+50cm Updated girth  52 5 cm   L M+50cm Girth Calculation  1 cm   R M+50cm Initial girth  52 25 cm   R M+50cm Updated girth  51 5 cm   R M+50cm Girth Calculation  -0 75 cm                       Precautions: NKA, B LE Lymphedema    Daily Treatment Diary     Manual  8-19            Modified MLD R BETHANY asif            B LE ready wraps + compression bandage R LE jph                                       x25'                Exercise Diary                                                                                                                                                                                                                                                                                      Modalities

## 2019-08-22 ENCOUNTER — OFFICE VISIT (OUTPATIENT)
Dept: PHYSICAL THERAPY | Facility: CLINIC | Age: 84
End: 2019-08-22
Payer: MEDICARE

## 2019-08-22 DIAGNOSIS — I89.0 LYMPHEDEMA: Primary | ICD-10-CM

## 2019-08-22 PROCEDURE — 97140 MANUAL THERAPY 1/> REGIONS: CPT | Performed by: PHYSICAL THERAPIST

## 2019-08-22 NOTE — PROGRESS NOTES
Daily Note     Today's date: 2019  Patient name: Dago Guzman  : 1935  MRN: [id number]  Referring provider: Lisy Sharp DPM  Dx:   Encounter Diagnosis     ICD-10-CM    1  Lymphedema I89 0                   Subjective: Pt presents for tx this day, yet to receive new wraps  Objective: See treatment diary below      Assessment: Tolerated treatment well  Patient would benefit from continued PT  Slight reduction in heaviness of R LE  Plan: Continue per plan of care           Precautions: NKA      Manual  8-22            MLD B LE jph            B LE ready wraps jp                                       x60'                Exercise Diary                                                                                                                                                                                                                                                                                      Modalities

## 2019-09-04 ENCOUNTER — OFFICE VISIT (OUTPATIENT)
Dept: PHYSICAL THERAPY | Facility: CLINIC | Age: 84
End: 2019-09-04
Payer: MEDICARE

## 2019-09-04 DIAGNOSIS — I89.0 LYMPHEDEMA: Primary | ICD-10-CM

## 2019-09-04 PROCEDURE — 97140 MANUAL THERAPY 1/> REGIONS: CPT | Performed by: PHYSICAL THERAPIST

## 2019-09-04 NOTE — PROGRESS NOTES
Daily Note     Today's date: 2019  Patient name: Nidhi Garcia  : 1935  MRN: [id number]  Referring provider: Leopoldo Cease , DPM  Dx:   Encounter Diagnosis     ICD-10-CM    1  Lymphedema I89 0                   Subjective: Pt presents for tx this day w/ new wraps received, slight isult to skin integrity R anterior lower leg, closed and covered w/ bandaid, no obvious indicator for concern at this time other than slowed healing associated w/ influence of lymphedema  Objective: See treatment diary below      Assessment: Tolerated treatment well  Patient would benefit from continued PT Much better fitment w/ new wraps, closing and providing optimal compression, will assess response NV  Plan: Continue per plan of care           Precautions: NKA      Manual  8-22 9-4           [facility]           B LE ready wraps [facility]                                      x60' x60'               Exercise Diary                                                                                                                                                                                                                                                                                      Modalities

## 2019-09-06 ENCOUNTER — OFFICE VISIT (OUTPATIENT)
Dept: PHYSICAL THERAPY | Facility: CLINIC | Age: 84
End: 2019-09-06
Payer: MEDICARE

## 2019-09-06 DIAGNOSIS — I89.0 LYMPHEDEMA: Primary | ICD-10-CM

## 2019-09-06 PROCEDURE — 97140 MANUAL THERAPY 1/> REGIONS: CPT | Performed by: PHYSICAL THERAPIST

## 2019-09-06 NOTE — PROGRESS NOTES
Daily Note     Today's date: 2019  Patient name: Steve Sosa  : 1935  MRN: [id number]  Referring provider: Marcelo Seals DPM  Dx:   Encounter Diagnosis     ICD-10-CM    1  Lymphedema I89 0                   Subjective: Pt has numerous small lacerations to skin likely from him scratching at his legs, actively bleeding  Addressed before initiating tx, cleaning and provided bandaids in sterile environment  Will monitor healing  Objective: See treatment diary below      Assessment: Tolerated treatment well  Patient would benefit from continued PT Overall vol reducing B LE, better response to new wraps, but advised pt to monitor his small skin lacerations and maintain antibiotic ointment as necessary  Plan: Continue per plan of care           Precautions: NKA      Manual  8-22 9-4 9-6          MLD B LE [facility]          B LE ready wraps [facility]                                     x60' x60' x60'              Exercise Diary                                                                                                                                                                                                                                                                                      Modalities

## 2019-09-09 ENCOUNTER — OFFICE VISIT (OUTPATIENT)
Dept: PHYSICAL THERAPY | Facility: CLINIC | Age: 84
End: 2019-09-09
Payer: MEDICARE

## 2019-09-09 DIAGNOSIS — I89.0 LYMPHEDEMA: Primary | ICD-10-CM

## 2019-09-09 PROCEDURE — 97140 MANUAL THERAPY 1/> REGIONS: CPT | Performed by: PHYSICAL THERAPIST

## 2019-09-09 NOTE — PROGRESS NOTES
Daily Note     Today's date: 2019  Patient name: Mahesh Livingston  : 1935  MRN: [id number]  Referring provider: Peyman Maxwell DPM  Dx:   Encounter Diagnosis     ICD-10-CM    1  Lymphedema I89 0                   Subjective: Pt reports his R leg had reduced to about the size of his L, was doing well, but yesterday () reports his leg "blew up" citing increased discomfort    Objective: See treatment diary below    Assessment: Tolerated treatment well  Patient would benefit from continued PT 2cm across x 1 cm height open wound along anterior aspect of R lower leg  Beefy red borders w/ clear exudate  R LE volume appears up this day w/ larger girth t/o R lower leg - below knee  Provided no adherent gauze pad this day under wraps to minimize friction over wound site, will assess response NV  Plan: Continue per plan of care           Precautions: NKA      Manual  8-22 9-4 9-6 9-9         MLD B LE jph [facility]         B LE ready wraps [facility]                                    x60' x60' x60' x55'             Exercise Diary                                                                                                                                                                                                                                                                                      Modalities

## 2019-09-13 ENCOUNTER — OFFICE VISIT (OUTPATIENT)
Dept: PHYSICAL THERAPY | Facility: CLINIC | Age: 84
End: 2019-09-13
Payer: MEDICARE

## 2019-09-13 DIAGNOSIS — I89.0 LYMPHEDEMA: Primary | ICD-10-CM

## 2019-09-13 PROCEDURE — 97140 MANUAL THERAPY 1/> REGIONS: CPT | Performed by: PHYSICAL THERAPIST

## 2019-09-13 NOTE — PROGRESS NOTES
Daily Note     Today's date: 2019  Patient name: Laquita Arauz  : 1935  MRN: [id number]  Referring provider: Jonnie Olivarez DPM  Dx:   Encounter Diagnosis     ICD-10-CM    1  Lymphedema I89 0                   Subjective: Pt reports legs are fair today, has band-aide on anterior aspect of R lower leg as wound is still healing, denies exudate  Reports PN in knee secondary to OA but reports the edema is making sx's worse  Objective: See treatment diary below    Assessment: Tolerated treatment well  Patient would benefit from continued PT Wound healing, maintained band aide this day  Concern remains for level of sig edema thru R>L LE  Plan: Continue per plan of care           Precautions: NKA      Manual  8-22 9-4 9-6 9-9 9-13        MLD B LE jph jph jph jp jp        B LE ready wraps [employer] [facility]                                   x60' x60' x60' x55' x55'            Exercise Diary                                                                                                                                                                                                                                                                                      Modalities

## 2019-09-16 ENCOUNTER — OFFICE VISIT (OUTPATIENT)
Dept: PHYSICAL THERAPY | Facility: CLINIC | Age: 84
End: 2019-09-16
Payer: MEDICARE

## 2019-09-16 DIAGNOSIS — I89.0 LYMPHEDEMA: Primary | ICD-10-CM

## 2019-09-16 PROCEDURE — 97140 MANUAL THERAPY 1/> REGIONS: CPT | Performed by: PHYSICAL THERAPIST

## 2019-09-16 NOTE — PROGRESS NOTES
Daily Note     Today's date: 2019  Patient name: Quni Colunga  : 1935  MRN: [id number]  Referring provider: Inna Hardwick DPM  Dx:   Encounter Diagnosis     ICD-10-CM    1  Lymphedema I89 0                   Subjective: Pt reports legs don't feel too bad, less irritable than last week, still utilizing band-aide on R lower anterior leg  Objective: See treatment diary below    Assessment: Tolerated treatment well  Patient would benefit from continued PT Wound remains anterior R lower leg, 1cm x 0 5cm w/ clear exudate weeping  Added artiflex cotton over wound under wrap to minimize friction from ready wraps on leg to promote wound healing  Plan: Continue per plan of care           Precautions: NKA      Manual  8-22 9-4 9-6 9-9 9-13 9-16       MLD B LE jph jph jph jp jp jp       B LE ready wraps jph Yesenia Manriques jph                                  x60' x60' x60' x55' x55' x60'           Exercise Diary                                                                                                                                                                                                                                                                                      Modalities
17-Jun-2018 20:01

## 2019-09-20 ENCOUNTER — OFFICE VISIT (OUTPATIENT)
Dept: PHYSICAL THERAPY | Facility: CLINIC | Age: 84
End: 2019-09-20
Payer: MEDICARE

## 2019-09-20 DIAGNOSIS — I89.0 LYMPHEDEMA: Primary | ICD-10-CM

## 2019-09-20 PROCEDURE — 97140 MANUAL THERAPY 1/> REGIONS: CPT | Performed by: PHYSICAL THERAPIST

## 2019-09-20 NOTE — PROGRESS NOTES
Daily Note     Today's date: 2019  Patient name: Merna Leon  : 1935  MRN: [id number]  Referring provider: Jevon Mittal DPM  Dx:   Encounter Diagnosis     ICD-10-CM    1  Lymphedema I89 0                   Subjective: Pt reports he feels his leg is blown up a little bit, repeatedly ends up scratching skin open w/ nails when don/doffing socks  Objective: See treatment diary below    Assessment: Tolerated treatment well  Patient would benefit from continued PT Clear weeping from wound site R anterior lower leg, unchanged since last visit, will cont to monitor  Plan: Continue per plan of care           Precautions: NKA      Manual  8-22 9-4 9-6 9-9 9-13 9-16 9-20      MLD B LE jph jph jph jph jph jph jp      B LE ready wraps jp Fauzia Leash jp                                 x60' x60' x60' x55' x55' x60' x55'          Exercise Diary                                                                                                                                                                                                                                                                                      Modalities

## 2019-09-23 ENCOUNTER — OFFICE VISIT (OUTPATIENT)
Dept: PHYSICAL THERAPY | Facility: CLINIC | Age: 84
End: 2019-09-23
Payer: MEDICARE

## 2019-09-23 DIAGNOSIS — I89.0 LYMPHEDEMA: Primary | ICD-10-CM

## 2019-09-23 PROCEDURE — 97140 MANUAL THERAPY 1/> REGIONS: CPT | Performed by: PHYSICAL THERAPIST

## 2019-09-23 NOTE — PROGRESS NOTES
Daily Note     Today's date: 2019  Patient name: Winsome Fine  : 1935  MRN: [id number]  Referring provider: Adrianna Thornton DPM  Dx:   Encounter Diagnosis     ICD-10-CM    1  Lymphedema I89 0                   Subjective: Pt reports no leaking from his leg OTW, his socks were clean  Is frustrated by cont'd chronic PN issues  Presents w/ mild labored breathing this day citing he feels that is exacerbated by ''those darn medical marijuana pipes" SpO2 93%      Objective: See treatment diary below    Flowsheet Rows      Most Recent Value   girth measurments   Extremity   Lower extremity   LE Girth Measurments  Forefoot, Ankle Figure 8, Malleoli, M+10cm, M+20cm, M+30cm, M+40cm, M+50cm   Forefoot   L Forefoot Initial girth  27 cm   L Forefoot Updated Girth  26 5 cm   L Forefoot Girth Calculation  -0 5 cm   R Forefoot Initial girth  27 5   R Forefoot Updated Girth  28   L Forefoot girth calculation  0 5   Ankle Figure 8   L Ankle Figure 8 Initial girth  65 cm   L Ankle Figure 8 Updated Girth  62 5 cm   L Ankle Figure 8 Girth Calculation  -2 5 cm   R Ankle Figure 8 Initial girth  64 cm   R Ankle Figure 8 Updated Girth  66 cm   R Ankle Figure 8 Girth Calculation  2 cm   Malleoli   L Malleoli Initial girth  34 cm   L Malleoli Updated girth  32 75 cm   L Malleoli Girth Calculation  -1 25 cm   R Malleoli Initial girth  31 5 cm   R Malleoli Updated girth  32 cm   R Malleoli Girth Calculation  0 5 cm   M+10cm    L M+10cm Initial girth  31 cm   L M+10cm Updated girth  30 cm   L M+10cm Girth Calculation  -1 cm   R M+10cm Initial girth  29 5 cm   R M+10cm Updated girth  31 25 cm   R M+10cm Girth Calculation  1 75 cm   M+20cm    L M+20cm Initial girth  39 5 cm   L M+20cm Updated girth  38 cm   L M+20cm Girth Calculation  -1 5 cm   R M+20cm Initial girth  38 75 cm   R M+20cm Updated girth  41 25 cm   R M+20cm Girth Calculation  2 5 cm   M+30cm    L M+30cm Initial girth  45 cm   L M+30cm Updated girth  45 5 cm L M+30cm Girth Calculation  0 5 cm   R M+30cm Initial girth  43 5 cm   R M+30cm Updated girth  47 cm   R M+30cm Girth Calculation  3 5 cm   M+40cm    L M+40cm Initial girth  46 cm   L M+40cm Updated girth  45 9 cm   L M+40cm Girth Calculation  -0 1 cm   R M+40cm Initial girth  48 cm   R M+40cm Updated girth  48 cm   R M+40cm Girth Calculation  0 cm   M+50cm    L M+50cm Initial girth  51 5 cm   L M+50cm Updated girth  52 cm   L M+50cm Girth Calculation  0 5 cm   R M+50cm Initial girth  52 25 cm   R M+50cm Updated girth  52 cm   R M+50cm Girth Calculation  -0 25 cm          Assessment: Tolerated treatment well  Patient would benefit from continued PT Volume reduced L LE but R remains high as per measurements this day, notable weeping when applying wraps w/ more pressure this AM      Plan: Continue per plan of care           Precautions: NKA      Manual  8-22 9-4 9-6 9-9 9-13 9-16 9-20 9-23     MLD B LE jph jph jph jph jph jph jph jph     B LE ready wraps jph jph Shelly Brooke                                H01' x60' x60' x55' x55' x60' x55' x55'         Exercise Diary                                                                                                                                                                                                                                                                                      Modalities

## 2019-09-27 ENCOUNTER — OFFICE VISIT (OUTPATIENT)
Dept: PHYSICAL THERAPY | Facility: CLINIC | Age: 84
End: 2019-09-27
Payer: MEDICARE

## 2019-09-27 DIAGNOSIS — I89.0 LYMPHEDEMA: Primary | ICD-10-CM

## 2019-09-27 PROCEDURE — 97140 MANUAL THERAPY 1/> REGIONS: CPT | Performed by: PHYSICAL THERAPIST

## 2019-09-27 NOTE — PROGRESS NOTES
Daily Note     Today's date: 2019  Patient name: Nidhi Garcia  : 1935  MRN: [id number]  Referring provider: Leopoldo Cease , DPM  Dx:   Encounter Diagnosis     ICD-10-CM    1  Lymphedema I89 0                   Subjective: Pt presents w/ weeping wound again R lower anterior leg  Objective: See treatment diary below      Assessment: Tolerated treatment well  Patient would benefit from continued PT Clear exudate weeping from R anterior lower leg this day along w/ slightly redened and irritated skin R LE this day, advised pt to monitor and also applied artiflex cotton wrap and bandages to minimize friction to area w/ wrap  Will assess NV  Plan: Continue per plan of care           Precautions: NKA      Manual  8- 9-4 9-6 9-9 9-13 9-16 9-20 9-23 9-27    MLD B LE jph jph jph jph jph jph jph jph jph    B LE ready wraps jph jph jph jph Mónica Perez' x60' x60' x55' x55' x60' x55' x55' x60'        Exercise Diary                                                                                                                                                                                                                                                                                      Modalities

## 2019-09-30 ENCOUNTER — OFFICE VISIT (OUTPATIENT)
Dept: PHYSICAL THERAPY | Facility: CLINIC | Age: 84
End: 2019-09-30
Payer: MEDICARE

## 2019-09-30 ENCOUNTER — TRANSCRIBE ORDERS (OUTPATIENT)
Dept: PHYSICAL THERAPY | Facility: CLINIC | Age: 84
End: 2019-09-30

## 2019-09-30 DIAGNOSIS — I89.0 OBLITERATION OF LYMPHATIC VESSEL: Primary | ICD-10-CM

## 2019-09-30 DIAGNOSIS — I89.0 LYMPHEDEMA: Primary | ICD-10-CM

## 2019-09-30 PROCEDURE — 97140 MANUAL THERAPY 1/> REGIONS: CPT | Performed by: PHYSICAL THERAPIST

## 2019-09-30 NOTE — LETTER
2019    Frank Kang DPM  [facility] [id number]    Patient: Qamar Klein   YOB: 1935   Date of Visit: 2019     Encounter Diagnosis     ICD-10-CM    1  Lymphedema I89 0        Dear Dr Yady Vazquez: Thank you for your recent referral of Qamar Klein  Please review the attached evaluation summary from Steve's recent visit  Please verify that you agree with the plan of care by signing the attached order  If you have any questions or concerns, please do not hesitate to call  I sincerely appreciate the opportunity to share in the care of one of your patients and hope to have another opportunity to work with you in the near future  Sincerely,    Zeus Hudson, PT      Referring Provider:      I certify that I have read the below Plan of Care and certify the need for these services furnished under this plan of treatment while under my care  Frank Kang DPM  [address] [facility] [place]  VIA Facsimile: [phone]          PT Re-Evaluation     Today's date: 2019  Patient name: Qamar Klein  : 1935  MRN: [id number]  Referring provider: Lee Snyder DPM  Dx:   Encounter Diagnosis     ICD-10-CM    1  Lymphedema I89 0                   Assessment  Assessment details: B LE edema is noted w/ severe skin deterioration R lower leg and marked hemosiderin staining B  Skilled PT is necessary to address including full CDT protocol to involve MLD, compression wraps and bandaging as appropriate w/ education regarding skin care and daily application of lotion including Rx cream       19 Update:  Pt demonstrates cont'd edema B LE but better under control w/ overall reduced skin fibrosis and improved control of volume B LE w/ use of B LE ready wraps daily   Of primary concern is nickel size superficial R LE medial mid calf wound and moderate fibrosis remaining R lower leg thru ankle and dorsum of foot  Pt instructed in self management/phase 2 CDT protocol and will maintain x 1 month w/ f/u to re-measure and determine potential need for cont'd care or d/c to phase 2 at that time  19 Update:  Pt's B LE overall vol reduced since last measurements w/ reduced girth B LE realized, managing skin well and B LE compression garments daily  No indication for full CDT at this time in favor of phase 2 - self management to continue w/ 3 mo f/u unless otherwise needed  19 Update:  Pt seen this day to address significantly worsened R LE edema w/ negative skin changes including + stemmer's, severe fibrosis, and presence of wounds along R foot dorsal met-heads  Pt requires new ready wraps, which he has ordered and is awaiting arrival   Skilled care should be re-initiated including modified MLD of B LE and cont'd use of wraps  Bandaging may also be considered to address  19 Update:  Pt has overall vol reduction B LE since last assessment but R LE open wound w/ active weeping remains a significant concern along R anterior lower leg w/ persistent dry scaly skin  Recommend cont'd skilled PT tx to include MLD and compression wrapping, but also suggest use of home compression pump to generate more consistency of compression B LE and to better facilitate wound healing + B LE vol reduction  Other impairment: B LE edema  Functional limitations: Limited amb potential and poor balanceUnderstanding of Dx/Px/POC: good   Prognosis: good    Goals  ST  Reduce B LE PN < 3/10 w/ all activity within 3 wks - Partially met  2  Reduce B LE girth measurements >1 cm within 3 wks - Met  LT  Reduce B LE girth measurements >3 cm within 6 wks - Not met  2  Reduce PN <1/10 within 6 wks - Not met  3   I in phase 2 CDT protocol within 6 wks - Not met    Plan  Patient would benefit from: skilled physical therapy  Referral necessary: Yes  Planned therapy interventions: manual therapy, massage and compression  Frequency: 2x week  Duration in visits: 10  Duration in weeks: 5  Plan of Care beginning date: 2019  Plan of Care expiration date: 2019  Treatment plan discussed with: patient        Subjective Evaluation    History of Present Illness  Mechanism of injury: Pt presents to PT w/ c/o B LE edema, R>L LE w/ sx's fluctuating but worse w/o compression  Pt had initially been seen for skilled PT to address utilizing CDT protocol however had been placed on hold and then d/c following a bout of cellulitis  Pt now returns admitting overall better management of legs but sx's more recently worsened and at times affecting his balance , amb, and activity potential   Pt utilizes a variety of Rx creams for legs and maintains wraps  19 Update:  Pt admits overall doing better but plagued by cont'd knee PN, R>L and also noticing that skin integrity R lower leg remains impaired  19 Update:  Pt was on hold x 1 wk, reports maintaining wraps all day, daily because he has noticed w/o, his legs swell up more  Pt agrees to hold x 1 month then f/u for re-measurement  Will f/u sooner if sx's worsen in between  19:  Pt admits color changes that vary in legs but overall doing well, denies sig PN, admits edema fluctuates and maintains wraps daily  Pt also maintains lotion daily  19: Pt requests visit this day citing his R leg "blew up" reporting a large increase in edema of R LE and also weeping along dorsum of R foot prevalent along met-heads  Pt expresses frustration w/ current condition of legs, admits he has ordered new ready wraps, awaiting arrival from   Pt maintains current wraps daily  19 Update:  Pt reports he continues to see a varying amount of weeping from R anterior lower leg, improving, but still an issue as he maintains a large bandaid over the area              Recurrent probem    Quality of life: fair    Pain  Current pain ratin  At best pain ratin  At worst pain rating: 3  Quality: dull ache, discomfort, cramping, tight and squeezing  Relieving factors: rest and support  Aggravating factors: walking    Treatments  Previous treatment: physical therapy  Patient Goals  Patient goals for therapy: decreased edema, independence with ADLs/IADLs, return to sport/leisure activities and improved balance          Objective     General Comments: Ankle/Foot Comments   Physical assessment: R LE severe scaling and dryness of skin t/o R lower leg and into R foot  Palpation: Mild ttp R>L LE  Skin Mobility: Severe R LE fibrosis  Skin color: + hemosiderin staining B LE distal 1/2 of B lower legs  Pitting: +1  Wound presence: R LE small (nickel size) surface wound along medial mid calf open, mild exudate wounds along dorsum of foot to distal 1/3rd of lower leg  Wound size: see above  Wound color: see above  Stemmer's Sign: + B LE  Gait assessment: Antalgic, fair balance, slight festination and good MICHELLE     Transfer status:  I w/ B UE A  Ability to don/doff clothing/garments: Min A                          Precautions: NKA, COURTNEY SIMS Lymphedema    Daily Treatment Diary     Manual  30            Modified MLD R LE jph            B LE ready wraps + compression bandage B LE ready wraps                                       x55'                Exercise Diary                                                                                                                                                                                                                                                                                      Modalities

## 2019-09-30 NOTE — PROGRESS NOTES
PT Re-Evaluation     Today's date: 2019  Patient name: Dago Guzman  : 1935  MRN: [id number]  Referring provider: Lisy Sharp DPM  Dx:   Encounter Diagnosis     ICD-10-CM    1  Lymphedema I89 0                   Assessment  Assessment details: B LE edema is noted w/ severe skin deterioration R lower leg and marked hemosiderin staining B  Skilled PT is necessary to address including full CDT protocol to involve MLD, compression wraps and bandaging as appropriate w/ education regarding skin care and daily application of lotion including Rx cream       19 Update:  Pt demonstrates cont'd edema B LE but better under control w/ overall reduced skin fibrosis and improved control of volume B LE w/ use of B LE ready wraps daily  Of primary concern is nickel size superficial R LE medial mid calf wound and moderate fibrosis remaining R lower leg thru ankle and dorsum of foot  Pt instructed in self management/phase 2 CDT protocol and will maintain x 1 month w/ f/u to re-measure and determine potential need for cont'd care or d/c to phase 2 at that time  19 Update:  Pt's B LE overall vol reduced since last measurements w/ reduced girth B LE realized, managing skin well and B LE compression garments daily  No indication for full CDT at this time in favor of phase 2 - self management to continue w/ 3 mo f/u unless otherwise needed  19 Update:  Pt seen this day to address significantly worsened R LE edema w/ negative skin changes including + stemmer's, severe fibrosis, and presence of wounds along R foot dorsal met-heads  Pt requires new ready wraps, which he has ordered and is awaiting arrival   Skilled care should be re-initiated including modified MLD of B LE and cont'd use of wraps  Bandaging may also be considered to address       19 Update:  Pt has overall vol reduction B LE since last assessment but R LE open wound w/ active weeping remains a significant concern along R anterior lower leg w/ persistent dry scaly skin  Recommend cont'd skilled PT tx to include MLD and compression wrapping, but also suggest use of home compression pump to generate more consistency of compression B LE and to better facilitate wound healing + B LE vol reduction  Other impairment: B LE edema  Functional limitations: Limited amb potential and poor balanceUnderstanding of Dx/Px/POC: good   Prognosis: good    Goals  ST  Reduce B LE PN < 3/10 w/ all activity within 3 wks - Partially met  2  Reduce B LE girth measurements >1 cm within 3 wks - Met  LT  Reduce B LE girth measurements >3 cm within 6 wks - Not met  2  Reduce PN <1/10 within 6 wks - Not met  3  I in phase 2 CDT protocol within 6 wks - Not met    Plan  Patient would benefit from: skilled physical therapy  Referral necessary: Yes  Planned therapy interventions: manual therapy, massage and compression  Frequency: 2x week  Duration in visits: 10  Duration in weeks: 5  Plan of Care beginning date: 2019  Plan of Care expiration date: 2019  Treatment plan discussed with: patient        Subjective Evaluation    History of Present Illness  Mechanism of injury: Pt presents to PT w/ c/o B LE edema, R>L LE w/ sx's fluctuating but worse w/o compression  Pt had initially been seen for skilled PT to address utilizing CDT protocol however had been placed on hold and then d/c following a bout of cellulitis  Pt now returns admitting overall better management of legs but sx's more recently worsened and at times affecting his balance , amb, and activity potential   Pt utilizes a variety of Rx creams for legs and maintains wraps  19 Update:  Pt admits overall doing better but plagued by cont'd knee PN, R>L and also noticing that skin integrity R lower leg remains impaired  19 Update:  Pt was on hold x 1 wk, reports maintaining wraps all day, daily because he has noticed w/o, his legs swell up more    Pt agrees to hold x 1 month then f/u for re-measurement  Will f/u sooner if sx's worsen in between  19:  Pt admits color changes that vary in legs but overall doing well, denies sig PN, admits edema fluctuates and maintains wraps daily  Pt also maintains lotion daily  19: Pt requests visit this day citing his R leg "blew up" reporting a large increase in edema of R LE and also weeping along dorsum of R foot prevalent along met-heads  Pt expresses frustration w/ current condition of legs, admits he has ordered new ready wraps, awaiting arrival from   Pt maintains current wraps daily  19 Update:  Pt reports he continues to see a varying amount of weeping from R anterior lower leg, improving, but still an issue as he maintains a large bandaid over the area  Recurrent probem    Quality of life: fair    Pain  Current pain ratin  At best pain ratin  At worst pain rating: 3  Quality: dull ache, discomfort, cramping, tight and squeezing  Relieving factors: rest and support  Aggravating factors: walking    Treatments  Previous treatment: physical therapy  Patient Goals  Patient goals for therapy: decreased edema, independence with ADLs/IADLs, return to sport/leisure activities and improved balance          Objective     General Comments: Ankle/Foot Comments   Physical assessment: R LE severe scaling and dryness of skin t/o R lower leg and into R foot  Palpation: Mild ttp R>L LE  Skin Mobility: Severe R LE fibrosis  Skin color: + hemosiderin staining B LE distal 1/2 of B lower legs  Pitting: +1  Wound presence: R LE small (nickel size) surface wound along medial mid calf open, mild exudate wounds along dorsum of foot to distal 1/3rd of lower leg  Wound size: see above  Wound color: see above  Stemmer's Sign: + B LE  Gait assessment: Antalgic, fair balance, slight festination and good MICHELLE     Transfer status:  I w/ B UE A  Ability to don/doff clothing/garments: Min A Precautions: COURTNEY VIERA Lymphedema    Daily Treatment Diary     Manual  9-30            Modified GEOFF Franco            B LE ready wraps + compression bandage B LE ready wraps                                       x55'                Exercise Diary                                                                                                                                                                                                                                                                                      Modalities

## 2019-10-04 ENCOUNTER — OFFICE VISIT (OUTPATIENT)
Dept: PHYSICAL THERAPY | Facility: CLINIC | Age: 84
End: 2019-10-04
Payer: MEDICARE

## 2019-10-04 DIAGNOSIS — I89.0 LYMPHEDEMA: Primary | ICD-10-CM

## 2019-10-04 PROCEDURE — 97140 MANUAL THERAPY 1/> REGIONS: CPT | Performed by: PHYSICAL THERAPIST

## 2019-10-04 NOTE — PROGRESS NOTES
Daily Note     Today's date: 10/4/2019  Patient name: Maynor Gordon  : 1935  MRN: [id number]  Referring provider: Mariella Arreguin DPM  Dx:   Encounter Diagnosis     ICD-10-CM    1  Lymphedema I89 0                   Subjective: Pt presents reporting a weeping in R LE yet still, more heavy today  Objective: See treatment diary below      Assessment: Tolerated treatment well  Patient would benefit from continued PT Pt has mild exudate, clear weeping still from wound R anterior lower leg  Advised pt that he should hopefully be receiving pump next wk  Plan: Continue per plan of care           Precautions: NKA      Manual  8-22 9-4 9-6 9-9 9-13 9-16 9-20 9-23 9-27 10-4   MLD B LE jph jph jph jph jph jph jph jph jph jph   B LE ready wraps jph jph jph jph jph Dawson Gonzalez' x60' x60' x55' x55' x60' x55' x55' x60' x55'       Exercise Diary                                                                                                                                                                                                                                                                                      Modalities

## 2019-10-16 ENCOUNTER — APPOINTMENT (OUTPATIENT)
Dept: PHYSICAL THERAPY | Facility: CLINIC | Age: 84
End: 2019-10-16
Payer: MEDICARE

## 2019-10-18 ENCOUNTER — APPOINTMENT (OUTPATIENT)
Dept: PHYSICAL THERAPY | Facility: CLINIC | Age: 84
End: 2019-10-18
Payer: MEDICARE

## 2019-10-21 ENCOUNTER — APPOINTMENT (OUTPATIENT)
Dept: PHYSICAL THERAPY | Facility: CLINIC | Age: 84
End: 2019-10-21
Payer: MEDICARE

## 2019-10-24 ENCOUNTER — APPOINTMENT (OUTPATIENT)
Dept: PHYSICAL THERAPY | Facility: CLINIC | Age: 84
End: 2019-10-24
Payer: MEDICARE

## 2019-10-28 ENCOUNTER — APPOINTMENT (OUTPATIENT)
Dept: PHYSICAL THERAPY | Facility: CLINIC | Age: 84
End: 2019-10-28
Payer: MEDICARE

## 2019-10-31 ENCOUNTER — APPOINTMENT (OUTPATIENT)
Dept: PHYSICAL THERAPY | Facility: CLINIC | Age: 84
End: 2019-10-31
Payer: MEDICARE

## 2019-11-26 ENCOUNTER — EVALUATION (OUTPATIENT)
Dept: PHYSICAL THERAPY | Facility: CLINIC | Age: 84
End: 2019-11-26
Payer: MEDICARE

## 2019-11-26 DIAGNOSIS — I89.0 LYMPHEDEMA: Primary | ICD-10-CM

## 2019-11-26 PROCEDURE — 97163 PT EVAL HIGH COMPLEX 45 MIN: CPT | Performed by: PHYSICAL THERAPIST

## 2019-11-26 NOTE — LETTER
2019    Nohemy Samson DPM  [facility] [id number]    Patient: Roxy Gaffney   YOB: 1935   Date of Visit: 2019     Encounter Diagnosis     ICD-10-CM    1  Lymphedema I89 0        Dear Dr Ted Cleaning: Thank you for your recent referral of Roxy Gaffney  Please review the attached evaluation summary from Steve's recent visit  Please verify that you agree with the plan of care by signing the attached order  If you have any questions or concerns, please do not hesitate to call  I sincerely appreciate the opportunity to share in the care of one of your patients and hope to have another opportunity to work with you in the near future  Sincerely,    Bushra Strickland, PT      Referring Provider:      I certify that I have read the below Plan of Care and certify the need for these services furnished under this plan of treatment while under my care  Nohemy Samson DPM  [id number] [place] [id number]  [facility] [place]          PT Evaluation     Today's date: 2019  Patient name: Roxy Gaffney  : 1935  MRN: [id number]  Referring provider: Dodie Hermosillo DPM  Dx:   Encounter Diagnosis     ICD-10-CM    1  Lymphedema I89 0                   Assessment  Assessment details: B LE edema is noted w/ severe skin deterioration R lower leg and marked hemosiderin staining B  Skilled PT is necessary to address including full CDT protocol to involve MLD, compression wraps and bandaging as appropriate w/ education regarding skin care and daily application of lotion including Rx cream       19 Update:  Pt demonstrates cont'd edema B LE but better under control w/ overall reduced skin fibrosis and improved control of volume B LE w/ use of B LE ready wraps daily   Of primary concern is nickel size superficial R LE medial mid calf wound and moderate fibrosis remaining R lower leg thru ankle and dorsum of foot   Pt instructed in self management/phase 2 CDT protocol and will maintain x 1 month w/ f/u to re-measure and determine potential need for cont'd care or d/c to phase 2 at that time  19 Update:  Pt's B LE overall vol reduced since last measurements w/ reduced girth B LE realized, managing skin well and B LE compression garments daily  No indication for full CDT at this time in favor of phase 2 - self management to continue w/ 3 mo f/u unless otherwise needed  19 Update:  Pt seen this day to address significantly worsened R LE edema w/ negative skin changes including + stemmer's, severe fibrosis, and presence of wounds along R foot dorsal met-heads  Pt requires new ready wraps, which he has ordered and is awaiting arrival   Skilled care should be re-initiated including modified MLD of B LE and cont'd use of wraps  Bandaging may also be considered to address  19 Update:  Pt has overall vol reduction B LE since last assessment but R LE open wound w/ active weeping remains a significant concern along R anterior lower leg w/ persistent dry scaly skin  Recommend cont'd skilled PT tx to include MLD and compression wrapping, but also suggest use of home compression pump to generate more consistency of compression B LE and to better facilitate wound healing + B LE vol reduction  19 Update: Pt presents w/ sig reduction achieved B LE, R>L since last assessment w/ more importantly a resolution of exudate weeping from R LE and improved skin integrity thru B LE  Pt maintains compression day and night and also utilizes compression pump daily  Will f/u w/ pt in 1 month for RA to assess management pf phase 2 CDT protocol  Other impairment: B LE edema  Functional limitations: Limited amb potential and poor balanceUnderstanding of Dx/Px/POC: good   Prognosis: good    Goals  ST   Reduce B LE PN < 3/10 w/ all activity within 3 wks - NOT met  2  Reduce B LE girth measurements >1 cm within 3 wks - Met  LT  Reduce B LE girth measurements >3 cm within 6 wks - Not met  2  Reduce PN <1/10 within 6 wks - NOT met  3  I in phase 2 CDT protocol within 6 wks - Not met    Plan  Patient would benefit from: skilled physical therapy  Referral necessary: Yes  Planned therapy interventions: manual therapy, massage and compression  Frequency: 2x month  Duration in visits: 2  Duration in weeks: 5  Plan of Care beginning date: 2019  Plan of Care expiration date: 2019  Treatment plan discussed with: patient        Subjective Evaluation    History of Present Illness  Mechanism of injury: Pt presents to PT w/ c/o B LE edema, R>L LE w/ sx's fluctuating but worse w/o compression  Pt had initially been seen for skilled PT to address utilizing CDT protocol however had been placed on hold and then d/c following a bout of cellulitis  Pt now returns admitting overall better management of legs but sx's more recently worsened and at times affecting his balance , amb, and activity potential   Pt utilizes a variety of Rx creams for legs and maintains wraps  19 Update:  Pt admits overall doing better but plagued by cont'd knee PN, R>L and also noticing that skin integrity R lower leg remains impaired  19 Update:  Pt was on hold x 1 wk, reports maintaining wraps all day, daily because he has noticed w/o, his legs swell up more  Pt agrees to hold x 1 month then f/u for re-measurement  Will f/u sooner if sx's worsen in between  19:  Pt admits color changes that vary in legs but overall doing well, denies sig PN, admits edema fluctuates and maintains wraps daily  Pt also maintains lotion daily  19: Pt requests visit this day citing his R leg "blew up" reporting a large increase in edema of R LE and also weeping along dorsum of R foot prevalent along met-heads    Pt expresses frustration w/ current condition of legs, admits he has ordered new ready wraps, awaiting arrival from   Pt maintains current wraps daily  19 Update:  Pt reports he continues to see a varying amount of weeping from R anterior lower leg, improving, but still an issue as he maintains a large bandaid over the area  19 Update:  Pt presents this day after hiatus in care w/ examination from orthopaedic specialist w/ MRI ofR knee + for meniscal tear, recent inj to knee mid  w/ + reduction of knee PN sx's, however PN does still persist w/ daily activity and amb  Pt at that time had cont'd presence of severe edema in B LE w/ active weeping thru R lower leg  Consistent use of B LE compression pump and management of readywraps day and night has yielded a reduction thru B LE and resolution of R lower leg weeping  Presents this day for assessment  Pt also admits dieting w/ loss of 13lbs thus far  Interest is preparing leg to be considered for surgical intervention to address R knee pathology             Recurrent probem    Quality of life: fair    Pain  Current pain ratin  At best pain ratin  At worst pain ratin  Location: R knee  Quality: dull ache, discomfort, cramping, tight and squeezing  Relieving factors: rest and support  Aggravating factors: walking  Progression: no change      Diagnostic Tests  MRI studies: abnormal  Treatments  Previous treatment: physical therapy  Current treatment: injection treatment  Patient Goals  Patient goals for therapy: decreased edema, independence with ADLs/IADLs, return to sport/leisure activities and improved balance        Flowsheet Rows      Most Recent Value   girth measurments   Extremity   Lower extremity   LE Girth Measurments  Forefoot, Ankle Figure 8, Malleoli, M+10cm, M+20cm, M+30cm, M+40cm, M+50cm   Forefoot   L Forefoot Initial girth  25 5 cm   R Forefoot Initial girth  27 25   Ankle Figure 8   L Ankle Figure 8 Initial girth  62 cm   R Ankle Figure 8 Initial girth  62 cm   Malleoli   L Malleoli Initial girth  31 25 cm   R Malleoli Initial girth  29 cm   M+10cm    L M+10cm Initial girth  28 cm   R M+10cm Initial girth  28 5 cm   M+20cm    L M+20cm Initial girth  38 cm   R M+20cm Initial girth  37 5 cm   M+30cm    L M+30cm Initial girth  44 25 cm   R M+30cm Initial girth  44 cm   M+40cm    L M+40cm Initial girth  46 cm   R M+40cm Initial girth  45 cm   M+50cm    L M+50cm Initial girth  51 5 cm   R M+50cm Initial girth  50 cm          Objective     General Comments: Ankle/Foot Comments   Physical assessment: B LE moderate scaling and dryness of skin t/o B lower leg and into B feet  Palpation: Mild ttp R>L LE  Skin Mobility: Severe R LE fibrosis  Skin color: + hemosiderin staining B LE distal 1/2 of B lower legs  R medial knee 4cm x 6cm region of redness appears to be from dry skin/friction irritation  Pitting: +1  Wound presence: R LE wounds have resolved, scarring noted w/ cont'd healing of skin R lower leg  Wound size: see above  Wound color: see above  Stemmer's Sign: + B LE  Gait assessment: Antalgic, fair balance, slight festination and good MICHELLE     Transfer status:  I w/ B UE A  Ability to don/doff clothing/garments: Min A                          Precautions: COURTNEY VIERA Lymphedema    Daily Treatment Diary     Manual  11-26            Modified MLD R LE NT            B LE ready wraps + compression bandage B LE ready wraps                                       x50'                Exercise Diary                                                                                                                                                                                                                                                                                      Modalities

## 2019-11-26 NOTE — PROGRESS NOTES
PT Evaluation     Today's date: 2019  Patient name: Lyla Helms  : 1935  MRN: [id number]  Referring provider: Juana Arauz DPM  Dx:   Encounter Diagnosis     ICD-10-CM    1  Lymphedema I89 0                   Assessment  Assessment details: B LE edema is noted w/ severe skin deterioration R lower leg and marked hemosiderin staining B  Skilled PT is necessary to address including full CDT protocol to involve MLD, compression wraps and bandaging as appropriate w/ education regarding skin care and daily application of lotion including Rx cream       19 Update:  Pt demonstrates cont'd edema B LE but better under control w/ overall reduced skin fibrosis and improved control of volume B LE w/ use of B LE ready wraps daily  Of primary concern is nickel size superficial R LE medial mid calf wound and moderate fibrosis remaining R lower leg thru ankle and dorsum of foot  Pt instructed in self management/phase 2 CDT protocol and will maintain x 1 month w/ f/u to re-measure and determine potential need for cont'd care or d/c to phase 2 at that time  19 Update:  Pt's B LE overall vol reduced since last measurements w/ reduced girth B LE realized, managing skin well and B LE compression garments daily  No indication for full CDT at this time in favor of phase 2 - self management to continue w/ 3 mo f/u unless otherwise needed  19 Update:  Pt seen this day to address significantly worsened R LE edema w/ negative skin changes including + stemmer's, severe fibrosis, and presence of wounds along R foot dorsal met-heads  Pt requires new ready wraps, which he has ordered and is awaiting arrival   Skilled care should be re-initiated including modified MLD of B LE and cont'd use of wraps  Bandaging may also be considered to address       19 Update:  Pt has overall vol reduction B LE since last assessment but R LE open wound w/ active weeping remains a significant concern along R anterior lower leg w/ persistent dry scaly skin  Recommend cont'd skilled PT tx to include MLD and compression wrapping, but also suggest use of home compression pump to generate more consistency of compression B LE and to better facilitate wound healing + B LE vol reduction  19 Update: Pt presents w/ sig reduction achieved B LE, R>L since last assessment w/ more importantly a resolution of exudate weeping from R LE and improved skin integrity thru B LE  Pt maintains compression day and night and also utilizes compression pump daily  Will f/u w/ pt in 1 month for RA to assess management pf phase 2 CDT protocol  Other impairment: B LE edema  Functional limitations: Limited amb potential and poor balanceUnderstanding of Dx/Px/POC: good   Prognosis: good    Goals  ST  Reduce B LE PN < 3/10 w/ all activity within 3 wks - NOT met  2  Reduce B LE girth measurements >1 cm within 3 wks - Met  LT  Reduce B LE girth measurements >3 cm within 6 wks - Not met  2  Reduce PN <1/10 within 6 wks - NOT met  3  I in phase 2 CDT protocol within 6 wks - Not met    Plan  Patient would benefit from: skilled physical therapy  Referral necessary: Yes  Planned therapy interventions: manual therapy, massage and compression  Frequency: 2x month  Duration in visits: 2  Duration in weeks: 5  Plan of Care beginning date: 2019  Plan of Care expiration date: 2019  Treatment plan discussed with: patient        Subjective Evaluation    History of Present Illness  Mechanism of injury: Pt presents to PT w/ c/o B LE edema, R>L LE w/ sx's fluctuating but worse w/o compression  Pt had initially been seen for skilled PT to address utilizing CDT protocol however had been placed on hold and then d/c following a bout of cellulitis    Pt now returns admitting overall better management of legs but sx's more recently worsened and at times affecting his balance , amb, and activity potential   Pt utilizes a variety of Rx creams for legs and maintains wraps  19 Update:  Pt admits overall doing better but plagued by cont'd knee PN, R>L and also noticing that skin integrity R lower leg remains impaired  19 Update:  Pt was on hold x 1 wk, reports maintaining wraps all day, daily because he has noticed w/o, his legs swell up more  Pt agrees to hold x 1 month then f/u for re-measurement  Will f/u sooner if sx's worsen in between  19:  Pt admits color changes that vary in legs but overall doing well, denies sig PN, admits edema fluctuates and maintains wraps daily  Pt also maintains lotion daily  19: Pt requests visit this day citing his R leg "blew up" reporting a large increase in edema of R LE and also weeping along dorsum of R foot prevalent along met-heads  Pt expresses frustration w/ current condition of legs, admits he has ordered new ready wraps, awaiting arrival from   Pt maintains current wraps daily  19 Update:  Pt reports he continues to see a varying amount of weeping from R anterior lower leg, improving, but still an issue as he maintains a large bandaid over the area  19 Update:  Pt presents this day after hiatus in care w/ examination from orthopaedic specialist w/ MRI ofR knee + for meniscal tear, recent inj to knee mid  w/ + reduction of knee PN sx's, however PN does still persist w/ daily activity and amb  Pt at that time had cont'd presence of severe edema in B LE w/ active weeping thru R lower leg  Consistent use of B LE compression pump and management of readywraps day and night has yielded a reduction thru B LE and resolution of R lower leg weeping  Presents this day for assessment  Pt also admits dieting w/ loss of 13lbs thus far  Interest is preparing leg to be considered for surgical intervention to address R knee pathology             Recurrent probem    Quality of life: fair    Pain  Current pain ratin  At best pain rating: 4  At worst pain ratin  Location: R knee  Quality: dull ache, discomfort, cramping, tight and squeezing  Relieving factors: rest and support  Aggravating factors: walking  Progression: no change      Diagnostic Tests  MRI studies: abnormal  Treatments  Previous treatment: physical therapy  Current treatment: injection treatment  Patient Goals  Patient goals for therapy: decreased edema, independence with ADLs/IADLs, return to sport/leisure activities and improved balance        Flowsheet Rows      Most Recent Value   girth measurments   Extremity   Lower extremity   LE Girth Measurments  Forefoot, Ankle Figure 8, Malleoli, M+10cm, M+20cm, M+30cm, M+40cm, M+50cm   Forefoot   L Forefoot Initial girth  25 5 cm   R Forefoot Initial girth  27 25   Ankle Figure 8   L Ankle Figure 8 Initial girth  62 cm   R Ankle Figure 8 Initial girth  62 cm   Malleoli   L Malleoli Initial girth  31 25 cm   R Malleoli Initial girth  29 cm   M+10cm    L M+10cm Initial girth  28 cm   R M+10cm Initial girth  28 5 cm   M+20cm    L M+20cm Initial girth  38 cm   R M+20cm Initial girth  37 5 cm   M+30cm    L M+30cm Initial girth  44 25 cm   R M+30cm Initial girth  44 cm   M+40cm    L M+40cm Initial girth  46 cm   R M+40cm Initial girth  45 cm   M+50cm    L M+50cm Initial girth  51 5 cm   R M+50cm Initial girth  50 cm          Objective     General Comments: Ankle/Foot Comments   Physical assessment: B LE moderate scaling and dryness of skin t/o B lower leg and into B feet  Palpation: Mild ttp R>L LE  Skin Mobility: Severe R LE fibrosis  Skin color: + hemosiderin staining B LE distal 1/2 of B lower legs  R medial knee 4cm x 6cm region of redness appears to be from dry skin/friction irritation  Pitting: +1  Wound presence: R LE wounds have resolved, scarring noted w/ cont'd healing of skin R lower leg     Wound size: see above  Wound color: see above  Stemmer's Sign: + B LE  Gait assessment: Antalgic, fair balance, slight festination and good MICHELLE     Transfer status:  I w/ B UE A  Ability to don/doff clothing/garments: Min A                          Precautions: NKCOURTNEY MORALES Lymphedema    Daily Treatment Diary     Manual  11-26            Modified GEOFF DUMAS            B BETHANY ready wraps + compression bandage B LE ready wraps                                       x50'                Exercise Diary                                                                                                                                                                                                                                                                                      Modalities

## 2019-11-29 ENCOUNTER — TRANSCRIBE ORDERS (OUTPATIENT)
Dept: PHYSICAL THERAPY | Facility: CLINIC | Age: 84
End: 2019-11-29

## 2019-11-29 DIAGNOSIS — I89.0 LYMPHEDEMA: Primary | ICD-10-CM

## 2019-12-30 ENCOUNTER — EVALUATION (OUTPATIENT)
Dept: PHYSICAL THERAPY | Facility: CLINIC | Age: 84
End: 2019-12-30
Payer: MEDICARE

## 2019-12-30 ENCOUNTER — TRANSCRIBE ORDERS (OUTPATIENT)
Dept: PHYSICAL THERAPY | Facility: CLINIC | Age: 84
End: 2019-12-30

## 2019-12-30 DIAGNOSIS — I89.0 CHRONIC ACQUIRED LYMPHEDEMA: Primary | ICD-10-CM

## 2019-12-30 DIAGNOSIS — I89.0 LYMPHEDEMA: Primary | ICD-10-CM

## 2019-12-30 PROCEDURE — 97140 MANUAL THERAPY 1/> REGIONS: CPT | Performed by: PHYSICAL THERAPIST

## 2019-12-30 PROCEDURE — 97164 PT RE-EVAL EST PLAN CARE: CPT | Performed by: PHYSICAL THERAPIST

## 2019-12-30 NOTE — LETTER
2019    Kaylah Fierro DPM  [facility] [id number]    Patient: Tyrone Jo   YOB: 1935   Date of Visit: 2019     Encounter Diagnosis     ICD-10-CM    1  Lymphedema I89 0        Dear Dr Loraine Hoover: Thank you for your recent referral of Tyrone Jo  Please review the attached evaluation summary from Steve's recent visit  Please verify that you agree with the plan of care by signing the attached order  If you have any questions or concerns, please do not hesitate to call  I sincerely appreciate the opportunity to share in the care of one of your patients and hope to have another opportunity to work with you in the near future  Sincerely,    Scar Thurman, PT      Referring Provider:      I certify that I have read the below Plan of Care and certify the need for these services furnished under this plan of treatment while under my care  Kaylah Fierro DPM  [address]  VIA Facsimile: [phone]          PT Evaluation  and PT Discharge    Today's date: 2019  Patient name: Tyrone Jo  : 1935  MRN: [id number]  Referring provider: Meghna Boyd DPM  Dx:   Encounter Diagnosis     ICD-10-CM    1  Lymphedema I89 0                   Assessment  Assessment details: B LE edema is noted w/ severe skin deterioration R lower leg and marked hemosiderin staining B  Skilled PT is necessary to address including full CDT protocol to involve MLD, compression wraps and bandaging as appropriate w/ education regarding skin care and daily application of lotion including Rx cream       19 Update:  Pt demonstrates cont'd edema B LE but better under control w/ overall reduced skin fibrosis and improved control of volume B LE w/ use of B LE ready wraps daily   Of primary concern is nickel size superficial R LE medial mid calf wound and moderate fibrosis remaining R lower leg thru ankle and dorsum of foot  Pt instructed in self management/phase 2 CDT protocol and will maintain x 1 month w/ f/u to re-measure and determine potential need for cont'd care or d/c to phase 2 at that time  5-6-19 Update:  Pt's B LE overall vol reduced since last measurements w/ reduced girth B LE realized, managing skin well and B LE compression garments daily  No indication for full CDT at this time in favor of phase 2 - self management to continue w/ 3 mo f/u unless otherwise needed  8-19-19 Update:  Pt seen this day to address significantly worsened R LE edema w/ negative skin changes including + stemmer's, severe fibrosis, and presence of wounds along R foot dorsal met-heads  Pt requires new ready wraps, which he has ordered and is awaiting arrival   Skilled care should be re-initiated including modified MLD of B LE and cont'd use of wraps  Bandaging may also be considered to address  9-30-19 Update:  Pt has overall vol reduction B LE since last assessment but R LE open wound w/ active weeping remains a significant concern along R anterior lower leg w/ persistent dry scaly skin  Recommend cont'd skilled PT tx to include MLD and compression wrapping, but also suggest use of home compression pump to generate more consistency of compression B LE and to better facilitate wound healing + B LE vol reduction  11-26-19 Update: Pt presents w/ sig reduction achieved B LE, R>L since last assessment w/ more importantly a resolution of exudate weeping from R LE and improved skin integrity thru B LE  Pt maintains compression day and night and also utilizes compression pump daily  Will f/u w/ pt in 1 month for RA to assess management pf phase 2 CDT protocol  12-30-19:  Update: Pt presents w/ just a mild increase in edema of B lower legs, more prominent in distal lower third B, but overall just a mild increase  Pt's skin remains dry and scaly    Pt denies any bouts of weeping since last assessment and reports daily application of ready wraps along w/ almost daily use of LE compression pumps  Pt is recommended to f/u in 3 mo unless sx's should worsen, maintain use of compression pump and B LE wraps + lotion application daily  Other impairment: B LE edema  Functional limitations: Limited amb potential and poor balanceUnderstanding of Dx/Px/POC: good   Prognosis: good    Goals  ST  Reduce B LE PN < 3/10 w/ all activity within 3 wks - NOT met  2  Reduce B LE girth measurements >1 cm within 3 wks - Met  LT  Reduce B LE girth measurements >3 cm within 6 wks - Not met  2  Reduce PN <1/10 within 6 wks - NOT met  3  I in phase 2 CDT protocol within 6 wks -MET    Plan  Patient would benefit from: skilled physical therapy  Referral necessary: Yes  Planned therapy interventions: manual therapy, massage and compression  Frequency: 1x week  Duration in visits: 1  Duration in weeks: 1  Plan of Care beginning date: 2019  Plan of Care expiration date: 2019  Treatment plan discussed with: patient        Subjective Evaluation    History of Present Illness  Mechanism of injury: Pt presents to PT w/ c/o B LE edema, R>L LE w/ sx's fluctuating but worse w/o compression  Pt had initially been seen for skilled PT to address utilizing CDT protocol however had been placed on hold and then d/c following a bout of cellulitis  Pt now returns admitting overall better management of legs but sx's more recently worsened and at times affecting his balance , amb, and activity potential   Pt utilizes a variety of Rx creams for legs and maintains wraps  19 Update:  Pt admits overall doing better but plagued by cont'd knee PN, R>L and also noticing that skin integrity R lower leg remains impaired  19 Update:  Pt was on hold x 1 wk, reports maintaining wraps all day, daily because he has noticed w/o, his legs swell up more  Pt agrees to hold x 1 month then f/u for re-measurement    Will f/u sooner if sx's worsen in between  19:  Pt admits color changes that vary in legs but overall doing well, denies sig PN, admits edema fluctuates and maintains wraps daily  Pt also maintains lotion daily  19: Pt requests visit this day citing his R leg "blew up" reporting a large increase in edema of R LE and also weeping along dorsum of R foot prevalent along met-heads  Pt expresses frustration w/ current condition of legs, admits he has ordered new ready wraps, awaiting arrival from   Pt maintains current wraps daily  19 Update:  Pt reports he continues to see a varying amount of weeping from R anterior lower leg, improving, but still an issue as he maintains a large bandaid over the area  19 Update:  Pt presents this day after hiatus in care w/ examination from orthopaedic specialist w/ MRI ofR knee + for meniscal tear, recent inj to knee mid  w/ + reduction of knee PN sx's, however PN does still persist w/ daily activity and amb  Pt at that time had cont'd presence of severe edema in B LE w/ active weeping thru R lower leg  Consistent use of B LE compression pump and management of readywraps day and night has yielded a reduction thru B LE and resolution of R lower leg weeping  Presents this day for assessment  Pt also admits dieting w/ loss of 13lbs thus far  Interest is preparing leg to be considered for surgical intervention to address R knee pathology  19:  Pt presents for RA of B LE edema admitting diligence to daily application of wraps and frequent use of B LE compression pump  Despite dryness of skin pt does report daily lotion application  Pt expresses frustration regarding cont'd R knee PN, which limits functional activity and ab potential, minimal response to Medical marijuana which he admits he has discontinued             Recurrent probem    Quality of life: fair    Pain  Current pain ratin  At best pain ratin  At worst pain ratin  Location: R knee  Quality: dull ache, discomfort, cramping, tight and squeezing  Relieving factors: rest and support  Aggravating factors: walking  Progression: no change      Diagnostic Tests  MRI studies: abnormal  Treatments  Previous treatment: physical therapy  Current treatment: injection treatment  Patient Goals  Patient goals for therapy: decreased edema, independence with ADLs/IADLs, return to sport/leisure activities and improved balance            Objective     General Comments: Ankle/Foot Comments   Physical assessment: B LE moderate scaling and dryness of skin t/o B lower leg and into B feet  Palpation: Mild ttp R>L LE  Skin Mobility: Moderate B LE fibrosis  Skin color: + hemosiderin staining B LE distal 1/2 of B lower legs  Pitting: +1  Wound presence: R LE wounds have resolved, scarring noted w/ cont'd healing of skin R lower leg  Wound size: see above  Wound color: see above  Stemmer's Sign: + B LE  Gait assessment: Antalgic, fair balance, slight festination and good MICHELLE     Transfer status:  I w/ B UE A  Ability to don/doff clothing/garments: Min A    Flowsheet Rows      Most Recent Value   girth measurments   Extremity   Lower extremity   LE Girth Measurments  Forefoot, Ankle Figure 8, Malleoli, M+10cm, M+20cm, M+30cm, M+40cm, M+50cm   Forefoot   L Forefoot Initial girth  25 5 cm   L Forefoot Updated Girth  25 6 cm   L Forefoot Girth Calculation  0 1 cm   R Forefoot Initial girth  27 25   R Forefoot Updated Girth  27 5   L Forefoot girth calculation  0 25   Ankle Figure 8   L Ankle Figure 8 Initial girth  62 cm   L Ankle Figure 8 Updated Girth  64 cm   L Ankle Figure 8 Girth Calculation  2 cm   R Ankle Figure 8 Initial girth  62 cm   R Ankle Figure 8 Updated Girth  64 5 cm   R Ankle Figure 8 Girth Calculation  2 5 cm   Malleoli   L Malleoli Initial girth  31 25 cm   L Malleoli Updated girth  32 5 cm   L Malleoli Girth Calculation  1 25 cm   R Malleoli Initial girth  29 cm R Malleoli Updated girth  30 cm   R Malleoli Girth Calculation  1 cm   M+10cm    L M+10cm Initial girth  28 cm   L M+10cm Updated girth  29 25 cm   L M+10cm Girth Calculation  1 25 cm   R M+10cm Initial girth  28 5 cm   R M+10cm Updated girth  30 5 cm   R M+10cm Girth Calculation  2 cm   M+20cm    L M+20cm Initial girth  38 cm   L M+20cm Updated girth  38 25 cm   L M+20cm Girth Calculation  0 25 cm   R M+20cm Initial girth  37 5 cm   R M+20cm Updated girth  38 75 cm   R M+20cm Girth Calculation  1 25 cm   M+30cm    L M+30cm Initial girth  44 25 cm   L M+30cm Updated girth  44 5 cm   L M+30cm Girth Calculation  0 25 cm   R M+30cm Initial girth  44 cm   R M+30cm Updated girth  43 75 cm   R M+30cm Girth Calculation  -0 25 cm   M+40cm    L M+40cm Initial girth  46 cm   L M+40cm Updated girth  46 25 cm   L M+40cm Girth Calculation  0 25 cm   R M+40cm Initial girth  45 cm   R M+40cm Updated girth  45 5 cm   R M+40cm Girth Calculation  0 5 cm   M+50cm    L M+50cm Initial girth  51 5 cm   L M+50cm Updated girth  52 25 cm   L M+50cm Girth Calculation  0 75 cm   R M+50cm Initial girth  50 cm   R M+50cm Updated girth  51 cm   R M+50cm Girth Calculation  1 cm                              Precautions: NKA, B LE Lymphedema    Daily Treatment Diary     Manual  11-26            Modified MLD R LE NT            B LE ready wraps + compression bandage B LE ready wraps                                       x50'                Exercise Diary                                                                                                                                                                                                                                                                                      Modalities

## 2019-12-30 NOTE — PROGRESS NOTES
PT Evaluation  and PT Discharge    Today's date: 2019  Patient name: Andrea Nicole  : 1935  MRN: [id number]  Referring provider: Valencia Rutherford DPM  Dx:   Encounter Diagnosis     ICD-10-CM    1  Lymphedema I89 0                   Assessment  Assessment details: B LE edema is noted w/ severe skin deterioration R lower leg and marked hemosiderin staining B  Skilled PT is necessary to address including full CDT protocol to involve MLD, compression wraps and bandaging as appropriate w/ education regarding skin care and daily application of lotion including Rx cream       19 Update:  Pt demonstrates cont'd edema B LE but better under control w/ overall reduced skin fibrosis and improved control of volume B LE w/ use of B LE ready wraps daily  Of primary concern is nickel size superficial R LE medial mid calf wound and moderate fibrosis remaining R lower leg thru ankle and dorsum of foot  Pt instructed in self management/phase 2 CDT protocol and will maintain x 1 month w/ f/u to re-measure and determine potential need for cont'd care or d/c to phase 2 at that time  19 Update:  Pt's B LE overall vol reduced since last measurements w/ reduced girth B LE realized, managing skin well and B LE compression garments daily  No indication for full CDT at this time in favor of phase 2 - self management to continue w/ 3 mo f/u unless otherwise needed  19 Update:  Pt seen this day to address significantly worsened R LE edema w/ negative skin changes including + stemmer's, severe fibrosis, and presence of wounds along R foot dorsal met-heads  Pt requires new ready wraps, which he has ordered and is awaiting arrival   Skilled care should be re-initiated including modified MLD of B LE and cont'd use of wraps  Bandaging may also be considered to address       19 Update:  Pt has overall vol reduction B LE since last assessment but R LE open wound w/ active weeping remains a significant concern along R anterior lower leg w/ persistent dry scaly skin  Recommend cont'd skilled PT tx to include MLD and compression wrapping, but also suggest use of home compression pump to generate more consistency of compression B LE and to better facilitate wound healing + B LE vol reduction  19 Update: Pt presents w/ sig reduction achieved B LE, R>L since last assessment w/ more importantly a resolution of exudate weeping from R LE and improved skin integrity thru B LE  Pt maintains compression day and night and also utilizes compression pump daily  Will f/u w/ pt in 1 month for RA to assess management pf phase 2 CDT protocol  19:  Update: Pt presents w/ just a mild increase in edema of B lower legs, more prominent in distal lower third B, but overall just a mild increase  Pt's skin remains dry and scaly  Pt denies any bouts of weeping since last assessment and reports daily application of ready wraps along w/ almost daily use of LE compression pumps  Pt is recommended to f/u in 3 mo unless sx's should worsen, maintain use of compression pump and B LE wraps + lotion application daily  Other impairment: B LE edema  Functional limitations: Limited amb potential and poor balanceUnderstanding of Dx/Px/POC: good   Prognosis: good    Goals  ST  Reduce B LE PN < 3/10 w/ all activity within 3 wks - NOT met  2  Reduce B LE girth measurements >1 cm within 3 wks - Met  LT  Reduce B LE girth measurements >3 cm within 6 wks - Not met  2  Reduce PN <1/10 within 6 wks - NOT met  3   I in phase 2 CDT protocol within 6 wks -MET    Plan  Patient would benefit from: skilled physical therapy  Referral necessary: Yes  Planned therapy interventions: manual therapy, massage and compression  Frequency: 1x week  Duration in visits: 1  Duration in weeks: 1  Plan of Care beginning date: 2019  Plan of Care expiration date: 2019  Treatment plan discussed with: patient        Subjective Evaluation    History of Present Illness  Mechanism of injury: Pt presents to PT w/ c/o B LE edema, R>L LE w/ sx's fluctuating but worse w/o compression  Pt had initially been seen for skilled PT to address utilizing CDT protocol however had been placed on hold and then d/c following a bout of cellulitis  Pt now returns admitting overall better management of legs but sx's more recently worsened and at times affecting his balance , amb, and activity potential   Pt utilizes a variety of Rx creams for legs and maintains wraps  2-26-19 Update:  Pt admits overall doing better but plagued by cont'd knee PN, R>L and also noticing that skin integrity R lower leg remains impaired  4-2-19 Update:  Pt was on hold x 1 wk, reports maintaining wraps all day, daily because he has noticed w/o, his legs swell up more  Pt agrees to hold x 1 month then f/u for re-measurement  Will f/u sooner if sx's worsen in between  5-6-19:  Pt admits color changes that vary in legs but overall doing well, denies sig PN, admits edema fluctuates and maintains wraps daily  Pt also maintains lotion daily  8-19-19: Pt requests visit this day citing his R leg "blew up" reporting a large increase in edema of R LE and also weeping along dorsum of R foot prevalent along met-heads  Pt expresses frustration w/ current condition of legs, admits he has ordered new ready wraps, awaiting arrival from   Pt maintains current wraps daily  9-30-19 Update:  Pt reports he continues to see a varying amount of weeping from R anterior lower leg, improving, but still an issue as he maintains a large bandaid over the area  11-26-19 Update:  Pt presents this day after hiatus in care w/ examination from orthopaedic specialist w/ MRI ofR knee + for meniscal tear, recent inj to knee mid Nov '19 w/ + reduction of knee PN sx's, however PN does still persist w/ daily activity and amb    Pt at that time had cont'd presence of severe edema in B LE w/ active weeping thru R lower leg  Consistent use of B LE compression pump and management of readywraps day and night has yielded a reduction thru B LE and resolution of R lower leg weeping  Presents this day for assessment  Pt also admits dieting w/ loss of 13lbs thus far  Interest is preparing leg to be considered for surgical intervention to address R knee pathology  19:  Pt presents for RA of B LE edema admitting diligence to daily application of wraps and frequent use of B LE compression pump  Despite dryness of skin pt does report daily lotion application  Pt expresses frustration regarding cont'd R knee PN, which limits functional activity and ab potential, minimal response to Medical marijuana which he admits he has discontinued  Recurrent probem    Quality of life: fair    Pain  Current pain ratin  At best pain ratin  At worst pain ratin  Location: R knee  Quality: dull ache, discomfort, cramping, tight and squeezing  Relieving factors: rest and support  Aggravating factors: walking  Progression: no change      Diagnostic Tests  MRI studies: abnormal  Treatments  Previous treatment: physical therapy  Current treatment: injection treatment  Patient Goals  Patient goals for therapy: decreased edema, independence with ADLs/IADLs, return to sport/leisure activities and improved balance            Objective     General Comments: Ankle/Foot Comments   Physical assessment: B LE moderate scaling and dryness of skin t/o B lower leg and into B feet  Palpation: Mild ttp R>L LE  Skin Mobility: Moderate B LE fibrosis  Skin color: + hemosiderin staining B LE distal 1/2 of B lower legs  Pitting: +1  Wound presence: R LE wounds have resolved, scarring noted w/ cont'd healing of skin R lower leg  Wound size: see above  Wound color: see above  Stemmer's Sign: + B LE  Gait assessment: Antalgic, fair balance, slight festination and good MICHELLE     Transfer status:  I w/ B UE A  Ability to don/doff clothing/garments: Min A    Flowsheet Rows      Most Recent Value   girth measurments   Extremity   Lower extremity   LE Girth Measurments  Forefoot, Ankle Figure 8, Malleoli, M+10cm, M+20cm, M+30cm, M+40cm, M+50cm   Forefoot   L Forefoot Initial girth  25 5 cm   L Forefoot Updated Girth  25 6 cm   L Forefoot Girth Calculation  0 1 cm   R Forefoot Initial girth  27 25   R Forefoot Updated Girth  27 5   L Forefoot girth calculation  0 25   Ankle Figure 8   L Ankle Figure 8 Initial girth  62 cm   L Ankle Figure 8 Updated Girth  64 cm   L Ankle Figure 8 Girth Calculation  2 cm   R Ankle Figure 8 Initial girth  62 cm   R Ankle Figure 8 Updated Girth  64 5 cm   R Ankle Figure 8 Girth Calculation  2 5 cm   Malleoli   L Malleoli Initial girth  31 25 cm   L Malleoli Updated girth  32 5 cm   L Malleoli Girth Calculation  1 25 cm   R Malleoli Initial girth  29 cm   R Malleoli Updated girth  30 cm   R Malleoli Girth Calculation  1 cm   M+10cm    L M+10cm Initial girth  28 cm   L M+10cm Updated girth  29 25 cm   L M+10cm Girth Calculation  1 25 cm   R M+10cm Initial girth  28 5 cm   R M+10cm Updated girth  30 5 cm   R M+10cm Girth Calculation  2 cm   M+20cm    L M+20cm Initial girth  38 cm   L M+20cm Updated girth  38 25 cm   L M+20cm Girth Calculation  0 25 cm   R M+20cm Initial girth  37 5 cm   R M+20cm Updated girth  38 75 cm   R M+20cm Girth Calculation  1 25 cm   M+30cm    L M+30cm Initial girth  44 25 cm   L M+30cm Updated girth  44 5 cm   L M+30cm Girth Calculation  0 25 cm   R M+30cm Initial girth  44 cm   R M+30cm Updated girth  43 75 cm   R M+30cm Girth Calculation  -0 25 cm   M+40cm    L M+40cm Initial girth  46 cm   L M+40cm Updated girth  46 25 cm   L M+40cm Girth Calculation  0 25 cm   R M+40cm Initial girth  45 cm   R M+40cm Updated girth  45 5 cm   R M+40cm Girth Calculation  0 5 cm   M+50cm    L M+50cm Initial girth  51 5 cm   L M+50cm Updated girth  52 25 cm   L M+50cm Girth Calculation  0 75 cm   R M+50cm Initial girth  50 cm   R M+50cm Updated girth  51 cm   R M+50cm Girth Calculation  1 cm                              Precautions: COURTNEY VIERA Lymphedema    Daily Treatment Diary     Manual  11-26            Modified MLNATHANAEL DUMAS            B LE ready wraps + compression bandage B LE ready wraps                                       x50'                Exercise Diary                                                                                                                                                                                                                                                                                      Modalities

## 2020-03-09 ENCOUNTER — OFFICE VISIT (OUTPATIENT)
Dept: PHYSICAL THERAPY | Facility: CLINIC | Age: 85
End: 2020-03-09
Payer: MEDICARE

## 2020-03-09 DIAGNOSIS — I89.0 LYMPHEDEMA: Primary | ICD-10-CM

## 2020-03-09 PROCEDURE — 97163 PT EVAL HIGH COMPLEX 45 MIN: CPT | Performed by: PHYSICAL THERAPIST

## 2020-03-09 NOTE — PROGRESS NOTES
PT Evaluation     Today's date: 3/9/2020  Patient name: Merlin Faith  : 1935  MRN: [id number]  Referring provider: June Farrar DPM  Dx:   Encounter Diagnosis     ICD-10-CM    1  Lymphedema I89 0                   Assessment  Assessment details: B LE edema is noted w/ severe skin deterioration R lower leg and marked hemosiderin staining B  Skilled PT is necessary to address including full CDT protocol to involve MLD, compression wraps and bandaging as appropriate w/ education regarding skin care and daily application of lotion including Rx cream       19 Update:  Pt demonstrates cont'd edema B LE but better under control w/ overall reduced skin fibrosis and improved control of volume B LE w/ use of B LE ready wraps daily  Of primary concern is nickel size superficial R LE medial mid calf wound and moderate fibrosis remaining R lower leg thru ankle and dorsum of foot  Pt instructed in self management/phase 2 CDT protocol and will maintain x 1 month w/ f/u to re-measure and determine potential need for cont'd care or d/c to phase 2 at that time  19 Update:  Pt's B LE overall vol reduced since last measurements w/ reduced girth B LE realized, managing skin well and B LE compression garments daily  No indication for full CDT at this time in favor of phase 2 - self management to continue w/ 3 mo f/u unless otherwise needed  19 Update:  Pt seen this day to address significantly worsened R LE edema w/ negative skin changes including + stemmer's, severe fibrosis, and presence of wounds along R foot dorsal met-heads  Pt requires new ready wraps, which he has ordered and is awaiting arrival   Skilled care should be re-initiated including modified MLD of B LE and cont'd use of wraps  Bandaging may also be considered to address       19 Update:  Pt has overall vol reduction B LE since last assessment but R LE open wound w/ active weeping remains a significant concern along R anterior lower leg w/ persistent dry scaly skin  Recommend cont'd skilled PT tx to include MLD and compression wrapping, but also suggest use of home compression pump to generate more consistency of compression B LE and to better facilitate wound healing + B LE vol reduction  19 Update: Pt presents w/ sig reduction achieved B LE, R>L since last assessment w/ more importantly a resolution of exudate weeping from R LE and improved skin integrity thru B LE  Pt maintains compression day and night and also utilizes compression pump daily  Will f/u w/ pt in 1 month for RA to assess management pf phase 2 CDT protocol  19:  Update: Pt presents w/ just a mild increase in edema of B lower legs, more prominent in distal lower third B, but overall just a mild increase  Pt's skin remains dry and scaly  Pt denies any bouts of weeping since last assessment and reports daily application of ready wraps along w/ almost daily use of LE compression pumps  Pt is recommended to f/u in 3 mo unless sx's should worsen, maintain use of compression pump and B LE wraps + lotion application daily  3-9-2020 Update:  Seeing an upward trend in girth measurements in comparison w/ previous measurements most notable in distal third of lower legs into B feet and ankles  Pt cites that his legs look somewhat worse this AM than they have been, admits he had not donned his wraps yet which could be affecting this  Advise pt to maintain home routine of pump and wraps and f/u in 1 month for re-check  Other impairment: B LE edema  Functional limitations: Limited amb potential and poor balanceUnderstanding of Dx/Px/POC: good   Prognosis: good    Goals  ST  Reduce B LE PN < 3/10 w/ all activity within 3 wks - NOT met  2  Reduce B LE girth measurements >1 cm within 3 wks   LT  Reduce B LE girth measurements >3 cm within 6 wks   2  Reduce PN <1/10 within 6 wks - NOT met   3   I in phase 2 CDT protocol within 6 wks -MET    Plan  Patient would benefit from: skilled physical therapy  Referral necessary: Yes  Planned therapy interventions: manual therapy, massage and compression  Frequency: 1x month  Duration in visits: 1  Duration in weeks: 6  Plan of Care beginning date: 3/9/2020  Plan of Care expiration date: 4/20/2020  Treatment plan discussed with: patient        Subjective Evaluation    History of Present Illness  Mechanism of injury: Pt presents to PT w/ c/o B LE edema, R>L LE w/ sx's fluctuating but worse w/o compression  Pt had initially been seen for skilled PT to address utilizing CDT protocol however had been placed on hold and then d/c following a bout of cellulitis  Pt now returns admitting overall better management of legs but sx's more recently worsened and at times affecting his balance , amb, and activity potential   Pt utilizes a variety of Rx creams for legs and maintains wraps  2-26-19 Update:  Pt admits overall doing better but plagued by cont'd knee PN, R>L and also noticing that skin integrity R lower leg remains impaired  4-2-19 Update:  Pt was on hold x 1 wk, reports maintaining wraps all day, daily because he has noticed w/o, his legs swell up more  Pt agrees to hold x 1 month then f/u for re-measurement  Will f/u sooner if sx's worsen in between  5-6-19:  Pt admits color changes that vary in legs but overall doing well, denies sig PN, admits edema fluctuates and maintains wraps daily  Pt also maintains lotion daily  8-19-19: Pt requests visit this day citing his R leg "blew up" reporting a large increase in edema of R LE and also weeping along dorsum of R foot prevalent along met-heads  Pt expresses frustration w/ current condition of legs, admits he has ordered new ready wraps, awaiting arrival from   Pt maintains current wraps daily       9-30-19 Update:  Pt reports he continues to see a varying amount of weeping from R anterior lower leg, improving, but still an issue as he maintains a large bandaid over the area  19 Update:  Pt presents this day after hiatus in care w/ examination from orthopaedic specialist w/ MRI ofR knee + for meniscal tear, recent inj to knee mid  w/ + reduction of knee PN sx's, however PN does still persist w/ daily activity and amb  Pt at that time had cont'd presence of severe edema in B LE w/ active weeping thru R lower leg  Consistent use of B LE compression pump and management of readywraps day and night has yielded a reduction thru B LE and resolution of R lower leg weeping  Presents this day for assessment  Pt also admits dieting w/ loss of 13lbs thus far  Interest is preparing leg to be considered for surgical intervention to address R knee pathology  19:  Pt presents for RA of B LE edema admitting diligence to daily application of wraps and frequent use of B LE compression pump  Despite dryness of skin pt does report daily lotion application  Pt expresses frustration regarding cont'd R knee PN, which limits functional activity and ab potential, minimal response to Medical marijuana which he admits he has discontinued  3-9-2020 Update:  Pt presents for follow up assessment reporting that his LE compression pump is helping, near daily use, and also maintaining wraps, however legs cramp up at times  Pt admits cont;d knee issues/PN that plagues him at times             Recurrent probem    Quality of life: fair    Pain  Current pain ratin  At best pain ratin  At worst pain ratin  Location: R knee  Quality: dull ache, discomfort, cramping, tight and squeezing  Relieving factors: rest and support  Aggravating factors: walking  Progression: no change      Diagnostic Tests  MRI studies: abnormal  Treatments  Previous treatment: physical therapy  Current treatment: injection treatment  Patient Goals  Patient goals for therapy: decreased edema, independence with ADLs/IADLs, return to sport/leisure activities and improved balance        Objective     General Comments: Ankle/Foot Comments   Physical assessment: B LE moderate scaling and dryness of skin t/o B lower leg and into B feet  Palpation: Mild ttp R>L LE  Skin Mobility: Moderate B LE fibrosis  Skin color: + hemosiderin staining B LE distal 1/2 of B lower legs  Pitting: +1  Wound presence: None  Wound size: n/a  Wound color: n/a  Stemmer's Sign: + B LE  Gait assessment: Antalgic, fair balance, slight festination and good MICHELLE     Transfer status:  I w/ B UE A  Ability to don/doff clothing/garments: Min A    Flowsheet Rows      Most Recent Value   girth measurments   Extremity   Lower extremity   LE Girth Measurments  Forefoot, Ankle Figure 8, Malleoli, M+10cm, M+20cm, M+30cm, M+40cm, M+50cm   Forefoot   L Forefoot Initial girth  25 5 cm   L Forefoot Updated Girth  27 cm   L Forefoot Girth Calculation  1 5 cm   R Forefoot Initial girth  27 25   R Forefoot Updated Girth  29   L Forefoot girth calculation  1 75   Ankle Figure 8   L Ankle Figure 8 Initial girth  62 cm   L Ankle Figure 8 Updated Girth  63 cm   L Ankle Figure 8 Girth Calculation  1 cm   R Ankle Figure 8 Initial girth  62 cm   R Ankle Figure 8 Updated Girth  63 75 cm   R Ankle Figure 8 Girth Calculation  1 75 cm   Malleoli   L Malleoli Initial girth  31 25 cm   L Malleoli Updated girth  33 25 cm   L Malleoli Girth Calculation  2 cm   R Malleoli Initial girth  29 cm   R Malleoli Updated girth  32 75 cm   R Malleoli Girth Calculation  3 75 cm   M+10cm    L M+10cm Initial girth  28 cm   L M+10cm Updated girth  30 75 cm   L M+10cm Girth Calculation  2 75 cm   R M+10cm Initial girth  28 5 cm   R M+10cm Updated girth  31 5 cm   R M+10cm Girth Calculation  3 cm   M+20cm    L M+20cm Initial girth  38 cm   L M+20cm Updated girth  38 cm   L M+20cm Girth Calculation  0 cm   R M+20cm Initial girth  37 5 cm   R M+20cm Updated girth  39 cm   R M+20cm Girth Calculation  1 5 cm   M+30cm    L M+30cm Initial girth  44 25 cm   L M+30cm Updated girth  44 5 cm   L M+30cm Girth Calculation  0 25 cm   R M+30cm Initial girth  44 cm   R M+30cm Updated girth  44 5 cm   R M+30cm Girth Calculation  0 5 cm   M+40cm    L M+40cm Initial girth  46 cm   L M+40cm Updated girth  43 75 cm   L M+40cm Girth Calculation  -2 25 cm   R M+40cm Initial girth  45 cm   R M+40cm Updated girth  45 25 cm   R M+40cm Girth Calculation  0 25 cm   M+50cm    L M+50cm Initial girth  51 5 cm   L M+50cm Updated girth  51 75 cm   L M+50cm Girth Calculation  0 25 cm   R M+50cm Initial girth  50 cm   R M+50cm Updated girth  52 cm   R M+50cm Girth Calculation  2 cm          Precautions: NKA, B LE Lymphedema    Daily Treatment Diary     Manual  3-9            Modified MLD R LE NT            B LE ready wraps + compression bandage B LE ready wraps                                                       Exercise Diary                                                                                                                                                                                                                                                                                      Modalities

## 2020-07-06 ENCOUNTER — EVALUATION (OUTPATIENT)
Dept: PHYSICAL THERAPY | Facility: CLINIC | Age: 85
End: 2020-07-06
Payer: MEDICARE

## 2020-07-06 DIAGNOSIS — I89.0 LYMPHEDEMA: Primary | ICD-10-CM

## 2020-07-06 PROCEDURE — 97163 PT EVAL HIGH COMPLEX 45 MIN: CPT | Performed by: PHYSICAL THERAPIST

## 2020-07-06 NOTE — PROGRESS NOTES
PT Evaluation     Today's date: 2020  Patient name: Deepa Qureshi  : 1935  MRN: [id number]  Referring provider: Kody Caldwell DPM  Dx:   Encounter Diagnosis     ICD-10-CM    1  Lymphedema I89 0                       Assessment  Assessment details: B LE edema is noted w/ severe skin deterioration R lower leg and marked hemosiderin staining B  Skilled PT is necessary to address including full CDT protocol to involve MLD, compression wraps and bandaging as appropriate w/ education regarding skin care and daily application of lotion including Rx cream       19 Update:  Pt demonstrates cont'd edema B LE but better under control w/ overall reduced skin fibrosis and improved control of volume B LE w/ use of B LE ready wraps daily  Of primary concern is nickel size superficial R LE medial mid calf wound and moderate fibrosis remaining R lower leg thru ankle and dorsum of foot  Pt instructed in self management/phase 2 CDT protocol and will maintain x 1 month w/ f/u to re-measure and determine potential need for cont'd care or d/c to phase 2 at that time  19 Update:  Pt's B LE overall vol reduced since last measurements w/ reduced girth B LE realized, managing skin well and B LE compression garments daily  No indication for full CDT at this time in favor of phase 2 - self management to continue w/ 3 mo f/u unless otherwise needed  19 Update:  Pt seen this day to address significantly worsened R LE edema w/ negative skin changes including + stemmer's, severe fibrosis, and presence of wounds along R foot dorsal met-heads  Pt requires new ready wraps, which he has ordered and is awaiting arrival   Skilled care should be re-initiated including modified MLD of B LE and cont'd use of wraps  Bandaging may also be considered to address       19 Update:  Pt has overall vol reduction B LE since last assessment but R LE open wound w/ active weeping remains a significant concern along R anterior lower leg w/ persistent dry scaly skin  Recommend cont'd skilled PT tx to include MLD and compression wrapping, but also suggest use of home compression pump to generate more consistency of compression B LE and to better facilitate wound healing + B LE vol reduction  11-26-19 Update: Pt presents w/ sig reduction achieved B LE, R>L since last assessment w/ more importantly a resolution of exudate weeping from R LE and improved skin integrity thru B LE  Pt maintains compression day and night and also utilizes compression pump daily  Will f/u w/ pt in 1 month for RA to assess management pf phase 2 CDT protocol  12-30-19:  Update: Pt presents w/ just a mild increase in edema of B lower legs, more prominent in distal lower third B, but overall just a mild increase  Pt's skin remains dry and scaly  Pt denies any bouts of weeping since last assessment and reports daily application of ready wraps along w/ almost daily use of LE compression pumps  Pt is recommended to f/u in 3 mo unless sx's should worsen, maintain use of compression pump and B LE wraps + lotion application daily  3-9-2020 Update:  Seeing an upward trend in girth measurements in comparison w/ previous measurements most notable in distal third of lower legs into B feet and ankles  Pt cites that his legs look somewhat worse this AM than they have been, admits he had not donned his wraps yet which could be affecting this  Advise pt to maintain home routine of pump and wraps and f/u in 1 month for re-check  7-6-2020 Update: Pt has been compliant with wearing wraps on B/L LE since last follow up with PT  He notes that he stopped taking his lasix medication because he was going to the bathroom too often  About a week ago, he noted an increase in size and drainage from R lower leg  He notes that he has been compliant with using his pumps daily, but notes that he does not see much of a change   Pt is interested in ordering new compression wraps for B/L LE this time around  Pt noted that he hit his R 3rd toe a few days ago on a drawer  He does report neuropathy in his feet, but states that it was bleeding a lot when it happened  Advised pt to call PCP to get it looked at, PT worried about possible infection/cellulits in 2/3rd toe on R LE  Follow up with patient before starting CDT next week  Other impairment: B LE edema  Functional limitations: Limited amb potential and poor balanceUnderstanding of Dx/Px/POC: good   Prognosis: good    Goals  ST  Pt will be compliant with wearing [employer] wraps daily to reduce limb size in 3 weeks  2  Reduce R LE girth measurements >1 cm within 3 wks  3  Pt will have healed skin tears, in R lower leg with no weeping present in 3 weeks  LT  Reduce B LE girth measurements >3 cm within 6 wks   2  Reduce PN <1/10 within 6 wks at its worst    3  I in phase 2 CDT protocol within 6 wks    Plan  Patient would benefit from: skilled physical therapy  Referral necessary: Yes  Planned therapy interventions: manual therapy, massage and compression  Frequency: 1x month  Duration in visits: 2  Duration in weeks: 6    Treatment plan discussed with: patient and wife         Subjective Evaluation    History of Present Illness  Mechanism of injury: Pt presents to PT w/ c/o B LE edema, R>L LE w/ sx's fluctuating but worse w/o compression  Pt had initially been seen for skilled PT to address utilizing CDT protocol however had been placed on hold and then d/c following a bout of cellulitis  Pt now returns admitting overall better management of legs but sx's more recently worsened and at times affecting his balance , amb, and activity potential   Pt utilizes a variety of Rx creams for legs and maintains wraps  19 Update:  Pt admits overall doing better but plagued by cont'd knee PN, R>L and also noticing that skin integrity R lower leg remains impaired       19 Update:  Pt was on hold x 1 wk, reports maintaining wraps all day, daily because he has noticed w/o, his legs swell up more  Pt agrees to hold x 1 month then f/u for re-measurement  Will f/u sooner if sx's worsen in between  5-6-19:  Pt admits color changes that vary in legs but overall doing well, denies sig PN, admits edema fluctuates and maintains wraps daily  Pt also maintains lotion daily  8-19-19: Pt requests visit this day citing his R leg "blew up" reporting a large increase in edema of R LE and also weeping along dorsum of R foot prevalent along met-heads  Pt expresses frustration w/ current condition of legs, admits he has ordered new ready wraps, awaiting arrival from   Pt maintains current wraps daily  9-30-19 Update:  Pt reports he continues to see a varying amount of weeping from R anterior lower leg, improving, but still an issue as he maintains a large bandaid over the area  11-26-19 Update:  Pt presents this day after hiatus in care w/ examination from orthopaedic specialist w/ MRI ofR knee + for meniscal tear, recent inj to knee mid Nov '19 w/ + reduction of knee PN sx's, however PN does still persist w/ daily activity and amb  Pt at that time had cont'd presence of severe edema in B LE w/ active weeping thru R lower leg  Consistent use of B LE compression pump and management of readywraps day and night has yielded a reduction thru B LE and resolution of R lower leg weeping  Presents this day for assessment  Pt also admits dieting w/ loss of 13lbs thus far  Interest is preparing leg to be considered for surgical intervention to address R knee pathology  12-30-19:  Pt presents for RA of B LE edema admitting diligence to daily application of wraps and frequent use of B LE compression pump  Despite dryness of skin pt does report daily lotion application   Pt expresses frustration regarding cont'd R knee PN, which limits functional activity and ab potential, minimal response to Medical marijuana which he admits he has discontinued  3-9-2020 Update:  Pt presents for follow up assessment reporting that his LE compression pump is helping, near daily use, and also maintaining wraps, however legs cramp up at times  Pt admits cont;d knee issues/PN that plagues him at times  2020: Update: Pt presents for a follow up assessment following an exacerbation in his lymphedema in his R LE  He noted that he was going to the bathroom every 15 minutes, so he stopped taking his lasix (about a week ago) and his R lower leg increased drastically in size  He knows that it was his fault, but was tired of not being able to live his life due to going to the bathroom every 10 minutes  He felt that he could not go anywhere because they were not allowing him to use their bathrooms in public  He notes that his leg is weeping and he does have a wound on the lateral aspect of his R lower leg  He bumped his 3rd R toe a few days and notes that it was bleeding a lot  Does not hurt him, but knows that it does not look good  Recurrent probem    Quality of life: fair    Pain  Current pain ratin  At best pain ratin  At worst pain ratin  Location: R knee  Quality: dull ache, discomfort, cramping, tight and squeezing  Relieving factors: rest and support  Aggravating factors: walking  Progression: no change      Diagnostic Tests  MRI studies: abnormal  Treatments  Previous treatment: physical therapy    Patient Goals  Patient goals for therapy: decreased edema, independence with ADLs/IADLs, return to sport/leisure activities and improved balance        Objective     General Comments: Ankle/Foot Comments   Physical assessment: B LE moderate scaling and dryness of skin t/o B lower leg and into B feet worse R>L, increased redness in R lower leg into foot  Palpation: Mild ttp R>L LE  Skin Mobility: Moderate B LE fibrosis  Skin color: + hemosiderin staining B LE distal 1/2 of B lower legs     Pitting: +1  Wound presence: skin tear on lateral aspect of lower R leg, and anterior shin  Two additional skin tears in posterior calf on R lower leg that were weeping moderate serous drainage   Wound size:  5cm x 5 cm   Wound color: -  Stemmer's Sign: + B LE  Gait assessment: Antalgic, fair balance, slight festination and good MICHELLE  Transfer status:  I w/ B UE A  Ability to don/doff clothing/garments: Min A    Assessed 2nd/3rd toe on R LE  Skin on tip of 3rd toe was not attached, white puss and odor present  Pt was tender to the touch in 2/3rd toe, increased redness present and white coloration on first layer of skin around 3rd toe  Pt does have a history of cellulitis, educated pt and wife on my concerns of how his 2/3rd toe looked  Advised him to call his PCP in the morning to get it looked at before starting CDT  Pt and wife verbalized understanding              Extremity   Lower extremity   LE Girthgirth measurments Measurments  Forefoot, Ankle Figure 8, Malleoli, M+10cm, M+20cm, M+30cm, M+40cm   Forefoot   L Forefoot Initial girth  27 cm   R Forefoot Initial girth 28 5 cm   Ankle Figure 8   L Ankle Figure 8 Initial girth 65 cm   R Ankle Figure 8 Initial girth 66 cm   Malleoli   L Malleoli Initial girth 31 5 cm   R Malleoli Initial girth 33 2 cm   M+10cm    L M+10cm Initial girth 32 5 cm   R M+10cm Initial girth 37 5 cm   M+20cm    L M+20cm Initial girth  38 2 cm   R M+20cm Initial girth 49 8 cm   M+30cm    L M+30cm Initial girth 46 cm   R M+30cm Initial girth 55 7 cm   M+40cm    L M+40cm Initial girth 44 5cm    R M+40cm Initial girth 53 2 cm                        Precautions: NKA, B LE Lymphedema, HTN,     Daily Treatment Diary     Manual  7-6            Modified MLD R LE             B LE ready wraps + compression bandage             education Arb                                           Exercise Diary Modalities

## 2020-07-06 NOTE — LETTER
2020    Dayne Brady DPM  [facility] [address]    Patient: Armida Singh   YOB: 1935   Date of Visit: 2020     Encounter Diagnosis     ICD-10-CM    1  Lymphedema I89 0        Dear Dr Deysi Massey: Thank you for your recent referral of Armida Singh  Please review the attached evaluation summary from Steve's recent visit  Please verify that you agree with the plan of care by signing the attached order  If you have any questions or concerns, please do not hesitate to call  I sincerely appreciate the opportunity to share in the care of one of your patients and hope to have another opportunity to work with you in the near future  Sincerely,    Kira Blackwell, PT      Referring Provider:      I certify that I have read the below Plan of Care and certify the need for these services furnished under this plan of treatment while under my care  Dayne Brady DPM  [address] [facility]  VIA Facsimile: [phone]          PT Evaluation     Today's date: 2020  Patient name: Armida Singh  : 1935  MRN: [id number]  Referring provider: Kolby Courtney DPM  Dx:   Encounter Diagnosis     ICD-10-CM    1  Lymphedema I89 0                       Assessment  Assessment details: B LE edema is noted w/ severe skin deterioration R lower leg and marked hemosiderin staining B  Skilled PT is necessary to address including full CDT protocol to involve MLD, compression wraps and bandaging as appropriate w/ education regarding skin care and daily application of lotion including Rx cream       19 Update:  Pt demonstrates cont'd edema B LE but better under control w/ overall reduced skin fibrosis and improved control of volume B LE w/ use of B LE ready wraps daily   Of primary concern is nickel size superficial R LE medial mid calf wound and moderate fibrosis remaining R lower leg thru ankle and dorsum of foot  Pt instructed in self management/phase 2 CDT protocol and will maintain x 1 month w/ f/u to re-measure and determine potential need for cont'd care or d/c to phase 2 at that time  5-6-19 Update:  Pt's B LE overall vol reduced since last measurements w/ reduced girth B LE realized, managing skin well and B LE compression garments daily  No indication for full CDT at this time in favor of phase 2 - self management to continue w/ 3 mo f/u unless otherwise needed  8-19-19 Update:  Pt seen this day to address significantly worsened R LE edema w/ negative skin changes including + stemmer's, severe fibrosis, and presence of wounds along R foot dorsal met-heads  Pt requires new ready wraps, which he has ordered and is awaiting arrival   Skilled care should be re-initiated including modified MLD of B LE and cont'd use of wraps  Bandaging may also be considered to address  9-30-19 Update:  Pt has overall vol reduction B LE since last assessment but R LE open wound w/ active weeping remains a significant concern along R anterior lower leg w/ persistent dry scaly skin  Recommend cont'd skilled PT tx to include MLD and compression wrapping, but also suggest use of home compression pump to generate more consistency of compression B LE and to better facilitate wound healing + B LE vol reduction  11-26-19 Update: Pt presents w/ sig reduction achieved B LE, R>L since last assessment w/ more importantly a resolution of exudate weeping from R LE and improved skin integrity thru B LE  Pt maintains compression day and night and also utilizes compression pump daily  Will f/u w/ pt in 1 month for RA to assess management pf phase 2 CDT protocol  12-30-19:  Update: Pt presents w/ just a mild increase in edema of B lower legs, more prominent in distal lower third B, but overall just a mild increase  Pt's skin remains dry and scaly    Pt denies any bouts of weeping since last assessment and reports daily application of ready wraps along w/ almost daily use of LE compression pumps  Pt is recommended to f/u in 3 mo unless sx's should worsen, maintain use of compression pump and B LE wraps + lotion application daily  3-9-2020 Update:  Seeing an upward trend in girth measurements in comparison w/ previous measurements most notable in distal third of lower legs into B feet and ankles  Pt cites that his legs look somewhat worse this AM than they have been, admits he had not donned his wraps yet which could be affecting this  Advise pt to maintain home routine of pump and wraps and f/u in 1 month for re-check  2020 Update: Pt has been compliant with wearing wraps on B/L LE since last follow up with PT  He notes that he stopped taking his lasix medication because he was going to the bathroom too often  About a week ago, he noted an increase in size and drainage from R lower leg  He notes that he has been compliant with using his pumps daily, but notes that he does not see much of a change  Pt is interested in ordering new compression wraps for B/L LE this time around  Pt noted that he hit his R 3rd toe a few days ago on a drawer  He does report neuropathy in his feet, but states that it was bleeding a lot when it happened  Advised pt to call PCP to get it looked at, PT worried about possible infection/cellulits in 2/3rd toe on R LE  Follow up with patient before starting CDT next week  Other impairment: B LE edema  Functional limitations: Limited amb potential and poor balanceUnderstanding of Dx/Px/POC: good   Prognosis: good    Goals  ST  Pt will be compliant with wearing [employer] wraps daily to reduce limb size in 3 weeks  2  Reduce R LE girth measurements >1 cm within 3 wks  3  Pt will have healed skin tears, in R lower leg with no weeping present in 3 weeks  LT  Reduce B LE girth measurements >3 cm within 6 wks   2   Reduce PN <1/10 within 6 wks at its worst    3  I in phase 2 CDT protocol within 6 wks    Plan  Patient would benefit from: skilled physical therapy  Referral necessary: Yes  Planned therapy interventions: manual therapy, massage and compression  Frequency: 1x month  Duration in visits: 2  Duration in weeks: 6    Treatment plan discussed with: patient and wife         Subjective Evaluation    History of Present Illness  Mechanism of injury: Pt presents to PT w/ c/o B LE edema, R>L LE w/ sx's fluctuating but worse w/o compression  Pt had initially been seen for skilled PT to address utilizing CDT protocol however had been placed on hold and then d/c following a bout of cellulitis  Pt now returns admitting overall better management of legs but sx's more recently worsened and at times affecting his balance , amb, and activity potential   Pt utilizes a variety of Rx creams for legs and maintains wraps  2-26-19 Update:  Pt admits overall doing better but plagued by cont'd knee PN, R>L and also noticing that skin integrity R lower leg remains impaired  4-2-19 Update:  Pt was on hold x 1 wk, reports maintaining wraps all day, daily because he has noticed w/o, his legs swell up more  Pt agrees to hold x 1 month then f/u for re-measurement  Will f/u sooner if sx's worsen in between  5-6-19:  Pt admits color changes that vary in legs but overall doing well, denies sig PN, admits edema fluctuates and maintains wraps daily  Pt also maintains lotion daily  8-19-19: Pt requests visit this day citing his R leg "blew up" reporting a large increase in edema of R LE and also weeping along dorsum of R foot prevalent along met-heads  Pt expresses frustration w/ current condition of legs, admits he has ordered new ready wraps, awaiting arrival from   Pt maintains current wraps daily       9-30-19 Update:  Pt reports he continues to see a varying amount of weeping from R anterior lower leg, improving, but still an issue as he maintains a large bandaid over the area       11-26-19 Update:  Pt presents this day after hiatus in care w/ examination from orthopaedic specialist w/ MRI ofR knee + for meniscal tear, recent inj to knee mid Nov '19 w/ + reduction of knee PN sx's, however PN does still persist w/ daily activity and amb  Pt at that time had cont'd presence of severe edema in B LE w/ active weeping thru R lower leg  Consistent use of B LE compression pump and management of readywraps day and night has yielded a reduction thru B LE and resolution of R lower leg weeping  Presents this day for assessment  Pt also admits dieting w/ loss of 13lbs thus far  Interest is preparing leg to be considered for surgical intervention to address R knee pathology  12-30-19:  Pt presents for RA of B LE edema admitting diligence to daily application of wraps and frequent use of B LE compression pump  Despite dryness of skin pt does report daily lotion application  Pt expresses frustration regarding cont'd R knee PN, which limits functional activity and ab potential, minimal response to Medical marijuana which he admits he has discontinued  3-9-2020 Update:  Pt presents for follow up assessment reporting that his LE compression pump is helping, near daily use, and also maintaining wraps, however legs cramp up at times  Pt admits cont;d knee issues/PN that plagues him at times  7-6-2020: Update: Pt presents for a follow up assessment following an exacerbation in his lymphedema in his R LE  He noted that he was going to the bathroom every 15 minutes, so he stopped taking his lasix (about a week ago) and his R lower leg increased drastically in size  He knows that it was his fault, but was tired of not being able to live his life due to going to the bathroom every 10 minutes  He felt that he could not go anywhere because they were not allowing him to use their bathrooms in public   He notes that his leg is weeping and he does have a wound on the lateral aspect of his R lower leg  He bumped his 3rd R toe a few days and notes that it was bleeding a lot  Does not hurt him, but knows that it does not look good  Recurrent probem    Quality of life: fair    Pain  Current pain ratin  At best pain ratin  At worst pain ratin  Location: R knee  Quality: dull ache, discomfort, cramping, tight and squeezing  Relieving factors: rest and support  Aggravating factors: walking  Progression: no change      Diagnostic Tests  MRI studies: abnormal  Treatments  Previous treatment: physical therapy    Patient Goals  Patient goals for therapy: decreased edema, independence with ADLs/IADLs, return to sport/leisure activities and improved balance        Objective     General Comments: Ankle/Foot Comments   Physical assessment: B LE moderate scaling and dryness of skin t/o B lower leg and into B feet worse R>L, increased redness in R lower leg into foot  Palpation: Mild ttp R>L LE  Skin Mobility: Moderate B LE fibrosis  Skin color: + hemosiderin staining B LE distal 1/2 of B lower legs  Pitting: +1  Wound presence: skin tear on lateral aspect of lower R leg, and anterior shin  Two additional skin tears in posterior calf on R lower leg that were weeping moderate serous drainage   Wound size:  5cm x 5 cm   Wound color: -  Stemmer's Sign: + B LE  Gait assessment: Antalgic, fair balance, slight festination and good MICHELLE  Transfer status:  I w/ B UE A  Ability to don/doff clothing/garments: Min A    Assessed 2nd/3rd toe on R LE  Skin on tip of 3rd toe was not attached, white puss and odor present  Pt was tender to the touch in 2/3rd toe, increased redness present and white coloration on first layer of skin around 3rd toe  Pt does have a history of cellulitis, educated pt and wife on my concerns of how his 2/3rd toe looked  Advised him to call his PCP in the morning to get it looked at before starting CDT  Pt and wife verbalized understanding              Extremity   Lower extremity LE Girthgirth measurments Measurments  Forefoot, Ankle Figure 8, Malleoli, M+10cm, M+20cm, M+30cm, M+40cm   Forefoot   L Forefoot Initial girth   27 cm   R Forefoot Initial girth 28 5 cm   Ankle Figure 8   L Ankle Figure 8 Initial girth 65 cm   R Ankle Figure 8 Initial girth 66 cm   Malleoli   L Malleoli Initial girth 31 5 cm   R Malleoli Initial girth 33 2 cm   M+10cm    L M+10cm Initial girth 32 5 cm   R M+10cm Initial girth 37 5 cm   M+20cm    L M+20cm Initial girth  38 2 cm   R M+20cm Initial girth 49 8 cm   M+30cm    L M+30cm Initial girth 46 cm   R M+30cm Initial girth 55 7 cm   M+40cm    L M+40cm Initial girth 44 5cm    R M+40cm Initial girth 53 2 cm                        Precautions: NKA, B LE Lymphedema, HTN,     Daily Treatment Diary     Manual  7-6            Modified MLD R LE             B LE ready wraps + compression bandage             education Arb                                           Exercise Diary                                                                                                                                                                                                                                                                                      Modalities

## 2020-07-06 NOTE — LETTER
2020    Mariama Pa DPM  [facility] [address]    Patient: Jose Manuel Ricci   YOB: 1935   Date of Visit: 2020     Encounter Diagnosis     ICD-10-CM    1  Lymphedema I89 0        Dear Dr Ayesha Zhao: Thank you for your recent referral of Jose Manuel Ricci  Please review the attached evaluation summary from Steve's recent visit  Please verify that you agree with the plan of care by signing the attached order  If you have any questions or concerns, please do not hesitate to call  I sincerely appreciate the opportunity to share in the care of one of your patients and hope to have another opportunity to work with you in the near future  Sincerely,    Kym Stephenson PT      Referring Provider:      I certify that I have read the below Plan of Care and certify the need for these services furnished under this plan of treatment while under my care  Mariama Pa DPM  [id number] [facility] [address]  [place]          PT Evaluation     Today's date: 2020  Patient name: Jose Manuel Ricci  : 1935  MRN: [id number]  Referring provider: Gabriela Joseph DPM  Dx:   Encounter Diagnosis     ICD-10-CM    1  Lymphedema I89 0                       Assessment  Assessment details: B LE edema is noted w/ severe skin deterioration R lower leg and marked hemosiderin staining B  Skilled PT is necessary to address including full CDT protocol to involve MLD, compression wraps and bandaging as appropriate w/ education regarding skin care and daily application of lotion including Rx cream       19 Update:  Pt demonstrates cont'd edema B LE but better under control w/ overall reduced skin fibrosis and improved control of volume B LE w/ use of B LE ready wraps daily  Of primary concern is nickel size superficial R LE medial mid calf wound and moderate fibrosis remaining R lower leg thru ankle and dorsum of foot  Pt instructed in self management/phase 2 CDT protocol and will maintain x 1 month w/ f/u to re-measure and determine potential need for cont'd care or d/c to phase 2 at that time  5-6-19 Update:  Pt's B LE overall vol reduced since last measurements w/ reduced girth B LE realized, managing skin well and B LE compression garments daily  No indication for full CDT at this time in favor of phase 2 - self management to continue w/ 3 mo f/u unless otherwise needed  8-19-19 Update:  Pt seen this day to address significantly worsened R LE edema w/ negative skin changes including + stemmer's, severe fibrosis, and presence of wounds along R foot dorsal met-heads  Pt requires new ready wraps, which he has ordered and is awaiting arrival   Skilled care should be re-initiated including modified MLD of B LE and cont'd use of wraps  Bandaging may also be considered to address  9-30-19 Update:  Pt has overall vol reduction B LE since last assessment but R LE open wound w/ active weeping remains a significant concern along R anterior lower leg w/ persistent dry scaly skin  Recommend cont'd skilled PT tx to include MLD and compression wrapping, but also suggest use of home compression pump to generate more consistency of compression B LE and to better facilitate wound healing + B LE vol reduction  11-26-19 Update: Pt presents w/ sig reduction achieved B LE, R>L since last assessment w/ more importantly a resolution of exudate weeping from R LE and improved skin integrity thru B LE  Pt maintains compression day and night and also utilizes compression pump daily  Will f/u w/ pt in 1 month for RA to assess management pf phase 2 CDT protocol  12-30-19:  Update: Pt presents w/ just a mild increase in edema of B lower legs, more prominent in distal lower third B, but overall just a mild increase  Pt's skin remains dry and scaly    Pt denies any bouts of weeping since last assessment and reports daily application of ready wraps along w/ almost daily use of LE compression pumps  Pt is recommended to f/u in 3 mo unless sx's should worsen, maintain use of compression pump and B LE wraps + lotion application daily  3-9-2020 Update:  Seeing an upward trend in girth measurements in comparison w/ previous measurements most notable in distal third of lower legs into B feet and ankles  Pt cites that his legs look somewhat worse this AM than they have been, admits he had not donned his wraps yet which could be affecting this  Advise pt to maintain home routine of pump and wraps and f/u in 1 month for re-check  2020 Update: Pt has been compliant with wearing wraps on B/L LE since last follow up with PT  He notes that he stopped taking his lasix medication because he was going to the bathroom too often  About a week ago, he noted an increase in size and drainage from R lower leg  He notes that he has been compliant with using his pumps daily, but notes that he does not see much of a change  Pt is interested in ordering new compression wraps for B/L LE this time around  Pt noted that he hit his R 3rd toe a few days ago on a drawer  He does report neuropathy in his feet, but states that it was bleeding a lot when it happened  Advised pt to call PCP to get it looked at, PT worried about possible infection/cellulits in 2/3rd toe on R LE  Follow up with patient before starting CDT next week  Other impairment: B LE edema  Functional limitations: Limited amb potential and poor balanceUnderstanding of Dx/Px/POC: good   Prognosis: good    Goals  ST  Pt will be compliant with wearing [employer] wraps daily to reduce limb size in 3 weeks  2  Reduce R LE girth measurements >1 cm within 3 wks  3  Pt will have healed skin tears, in R lower leg with no weeping present in 3 weeks  LT  Reduce B LE girth measurements >3 cm within 6 wks   2   Reduce PN <1/10 within 6 wks at its worst    3  I in phase 2 CDT protocol within 6 wks    Plan  Patient would benefit from: skilled physical therapy  Referral necessary: Yes  Planned therapy interventions: manual therapy, massage and compression  Frequency: 1x month  Duration in visits: 2  Duration in weeks: 6    Treatment plan discussed with: patient and wife         Subjective Evaluation    History of Present Illness  Mechanism of injury: Pt presents to PT w/ c/o B LE edema, R>L LE w/ sx's fluctuating but worse w/o compression  Pt had initially been seen for skilled PT to address utilizing CDT protocol however had been placed on hold and then d/c following a bout of cellulitis  Pt now returns admitting overall better management of legs but sx's more recently worsened and at times affecting his balance , amb, and activity potential   Pt utilizes a variety of Rx creams for legs and maintains wraps  2-26-19 Update:  Pt admits overall doing better but plagued by cont'd knee PN, R>L and also noticing that skin integrity R lower leg remains impaired  4-2-19 Update:  Pt was on hold x 1 wk, reports maintaining wraps all day, daily because he has noticed w/o, his legs swell up more  Pt agrees to hold x 1 month then f/u for re-measurement  Will f/u sooner if sx's worsen in between  5-6-19:  Pt admits color changes that vary in legs but overall doing well, denies sig PN, admits edema fluctuates and maintains wraps daily  Pt also maintains lotion daily  8-19-19: Pt requests visit this day citing his R leg "blew up" reporting a large increase in edema of R LE and also weeping along dorsum of R foot prevalent along met-heads  Pt expresses frustration w/ current condition of legs, admits he has ordered new ready wraps, awaiting arrival from   Pt maintains current wraps daily  9-30-19 Update:  Pt reports he continues to see a varying amount of weeping from R anterior lower leg, improving, but still an issue as he maintains a large bandaid over the area        11-26-19 Update:  Pt presents this day after hiatus in care w/ examination from orthopaedic specialist w/ MRI ofR knee + for meniscal tear, recent inj to knee mid Nov '19 w/ + reduction of knee PN sx's, however PN does still persist w/ daily activity and amb  Pt at that time had cont'd presence of severe edema in B LE w/ active weeping thru R lower leg  Consistent use of B LE compression pump and management of readywraps day and night has yielded a reduction thru B LE and resolution of R lower leg weeping  Presents this day for assessment  Pt also admits dieting w/ loss of 13lbs thus far  Interest is preparing leg to be considered for surgical intervention to address R knee pathology  12-30-19:  Pt presents for RA of B LE edema admitting diligence to daily application of wraps and frequent use of B LE compression pump  Despite dryness of skin pt does report daily lotion application  Pt expresses frustration regarding cont'd R knee PN, which limits functional activity and ab potential, minimal response to Medical marijuana which he admits he has discontinued  3-9-2020 Update:  Pt presents for follow up assessment reporting that his LE compression pump is helping, near daily use, and also maintaining wraps, however legs cramp up at times  Pt admits cont;d knee issues/PN that plagues him at times  7-6-2020: Update: Pt presents for a follow up assessment following an exacerbation in his lymphedema in his R LE  He noted that he was going to the bathroom every 15 minutes, so he stopped taking his lasix (about a week ago) and his R lower leg increased drastically in size  He knows that it was his fault, but was tired of not being able to live his life due to going to the bathroom every 10 minutes  He felt that he could not go anywhere because they were not allowing him to use their bathrooms in public  He notes that his leg is weeping and he does have a wound on the lateral aspect of his R lower leg   He bumped his 3rd R toe a few days and notes that it was bleeding a lot  Does not hurt him, but knows that it does not look good  Recurrent probem    Quality of life: fair    Pain  Current pain ratin  At best pain ratin  At worst pain ratin  Location: R knee  Quality: dull ache, discomfort, cramping, tight and squeezing  Relieving factors: rest and support  Aggravating factors: walking  Progression: no change      Diagnostic Tests  MRI studies: abnormal  Treatments  Previous treatment: physical therapy    Patient Goals  Patient goals for therapy: decreased edema, independence with ADLs/IADLs, return to sport/leisure activities and improved balance        Objective     General Comments: Ankle/Foot Comments   Physical assessment: B LE moderate scaling and dryness of skin t/o B lower leg and into B feet worse R>L, increased redness in R lower leg into foot  Palpation: Mild ttp R>L LE  Skin Mobility: Moderate B LE fibrosis  Skin color: + hemosiderin staining B LE distal 1/2 of B lower legs  Pitting: +1  Wound presence: skin tear on lateral aspect of lower R leg, and anterior shin  Two additional skin tears in posterior calf on R lower leg that were weeping moderate serous drainage   Wound size:  5cm x 5 cm   Wound color: -  Stemmer's Sign: + B LE  Gait assessment: Antalgic, fair balance, slight festination and good MICHELLE  Transfer status:  I w/ B UE A  Ability to don/doff clothing/garments: Min A    Assessed 2nd/3rd toe on R LE  Skin on tip of 3rd toe was not attached, white puss and odor present  Pt was tender to the touch in 2/3rd toe, increased redness present and white coloration on first layer of skin around 3rd toe  Pt does have a history of cellulitis, educated pt and wife on my concerns of how his 2/3rd toe looked  Advised him to call his PCP in the morning to get it looked at before starting CDT  Pt and wife verbalized understanding              Extremity   Lower extremity   LE Jordy Umaña measurments Measurments  Forefoot, Ankle Figure 8, Malleoli, M+10cm, M+20cm, M+30cm, M+40cm   Forefoot   L Forefoot Initial girth   27 cm   R Forefoot Initial girth 28 5 cm   Ankle Figure 8   L Ankle Figure 8 Initial girth 65 cm   R Ankle Figure 8 Initial girth 66 cm   Malleoli   L Malleoli Initial girth 31 5 cm   R Malleoli Initial girth 33 2 cm   M+10cm    L M+10cm Initial girth 32 5 cm   R M+10cm Initial girth 37 5 cm   M+20cm    L M+20cm Initial girth  38 2 cm   R M+20cm Initial girth 49 8 cm   M+30cm    L M+30cm Initial girth 46 cm   R M+30cm Initial girth 55 7 cm   M+40cm    L M+40cm Initial girth 44 5cm    R M+40cm Initial girth 53 2 cm                        Precautions: NKA, B LE Lymphedema, HTN,     Daily Treatment Diary     Manual  7-6            Modified MLD R LE             B LE ready wraps + compression bandage             education Arb                                           Exercise Diary                                                                                                                                                                                                                                                                                      Modalities

## 2020-07-07 ENCOUNTER — TRANSCRIBE ORDERS (OUTPATIENT)
Dept: PHYSICAL THERAPY | Facility: CLINIC | Age: 85
End: 2020-07-07

## 2020-07-07 DIAGNOSIS — I89.0 CHRONIC ACQUIRED LYMPHEDEMA: Primary | ICD-10-CM

## 2020-07-14 ENCOUNTER — APPOINTMENT (OUTPATIENT)
Dept: PHYSICAL THERAPY | Facility: CLINIC | Age: 85
End: 2020-07-14
Payer: MEDICARE

## 2020-08-05 NOTE — PROGRESS NOTES
D/C Summary: 08/05/20: Pt was admitted to hospital the following IE regarding possible 2nd toe infection and cellulitis dx  He has not returned any phone calls about scheudling to return to PT  At this time, pt is a self D/C from kayode Snow, PT

## 2020-10-22 ENCOUNTER — EVALUATION (OUTPATIENT)
Dept: PHYSICAL THERAPY | Facility: CLINIC | Age: 85
End: 2020-10-22
Payer: COMMERCIAL

## 2020-10-22 ENCOUNTER — TRANSCRIBE ORDERS (OUTPATIENT)
Dept: PHYSICAL THERAPY | Facility: CLINIC | Age: 85
End: 2020-10-22

## 2020-10-22 DIAGNOSIS — I89.0 LYMPHEDEMA: Primary | ICD-10-CM

## 2020-10-22 PROCEDURE — 97163 PT EVAL HIGH COMPLEX 45 MIN: CPT | Performed by: PHYSICAL THERAPIST

## 2020-10-26 ENCOUNTER — OFFICE VISIT (OUTPATIENT)
Dept: PHYSICAL THERAPY | Facility: CLINIC | Age: 85
End: 2020-10-26
Payer: COMMERCIAL

## 2020-10-26 DIAGNOSIS — I89.0 LYMPHEDEMA: Primary | ICD-10-CM

## 2020-10-26 PROCEDURE — 97140 MANUAL THERAPY 1/> REGIONS: CPT | Performed by: PHYSICAL THERAPIST

## 2020-10-27 ENCOUNTER — APPOINTMENT (OUTPATIENT)
Dept: PHYSICAL THERAPY | Facility: CLINIC | Age: 85
End: 2020-10-27
Payer: COMMERCIAL

## 2020-10-29 ENCOUNTER — APPOINTMENT (OUTPATIENT)
Dept: PHYSICAL THERAPY | Facility: CLINIC | Age: 85
End: 2020-10-29
Payer: COMMERCIAL

## 2020-11-03 ENCOUNTER — OFFICE VISIT (OUTPATIENT)
Dept: PHYSICAL THERAPY | Facility: CLINIC | Age: 85
End: 2020-11-03
Payer: COMMERCIAL

## 2020-11-03 DIAGNOSIS — I89.0 LYMPHEDEMA: Primary | ICD-10-CM

## 2020-11-03 PROCEDURE — 97140 MANUAL THERAPY 1/> REGIONS: CPT | Performed by: PHYSICAL THERAPIST

## 2020-11-06 ENCOUNTER — OFFICE VISIT (OUTPATIENT)
Dept: PHYSICAL THERAPY | Facility: CLINIC | Age: 85
End: 2020-11-06
Payer: COMMERCIAL

## 2020-11-06 DIAGNOSIS — I89.0 LYMPHEDEMA: Primary | ICD-10-CM

## 2020-11-06 PROCEDURE — 97140 MANUAL THERAPY 1/> REGIONS: CPT | Performed by: PHYSICAL THERAPIST

## 2020-11-10 ENCOUNTER — OFFICE VISIT (OUTPATIENT)
Dept: PHYSICAL THERAPY | Facility: CLINIC | Age: 85
End: 2020-11-10
Payer: COMMERCIAL

## 2020-11-10 DIAGNOSIS — I89.0 LYMPHEDEMA: Primary | ICD-10-CM

## 2020-11-10 PROCEDURE — 97140 MANUAL THERAPY 1/> REGIONS: CPT | Performed by: PHYSICAL THERAPIST

## 2020-11-12 ENCOUNTER — OFFICE VISIT (OUTPATIENT)
Dept: PHYSICAL THERAPY | Facility: CLINIC | Age: 85
End: 2020-11-12
Payer: COMMERCIAL

## 2020-11-12 DIAGNOSIS — I89.0 LYMPHEDEMA: Primary | ICD-10-CM

## 2020-11-12 PROCEDURE — 97140 MANUAL THERAPY 1/> REGIONS: CPT | Performed by: PHYSICAL THERAPIST

## 2020-11-17 ENCOUNTER — OFFICE VISIT (OUTPATIENT)
Dept: PHYSICAL THERAPY | Facility: CLINIC | Age: 85
End: 2020-11-17
Payer: COMMERCIAL

## 2020-11-17 DIAGNOSIS — I89.0 LYMPHEDEMA: Primary | ICD-10-CM

## 2020-11-17 PROCEDURE — 97140 MANUAL THERAPY 1/> REGIONS: CPT | Performed by: PHYSICAL THERAPIST

## 2020-11-19 ENCOUNTER — OFFICE VISIT (OUTPATIENT)
Dept: PHYSICAL THERAPY | Facility: CLINIC | Age: 85
End: 2020-11-19
Payer: COMMERCIAL

## 2020-11-19 DIAGNOSIS — I89.0 LYMPHEDEMA: Primary | ICD-10-CM

## 2020-11-19 PROCEDURE — 97140 MANUAL THERAPY 1/> REGIONS: CPT | Performed by: PHYSICAL THERAPIST

## 2020-11-24 ENCOUNTER — OFFICE VISIT (OUTPATIENT)
Dept: PHYSICAL THERAPY | Facility: CLINIC | Age: 85
End: 2020-11-24
Payer: COMMERCIAL

## 2020-11-24 DIAGNOSIS — I89.0 LYMPHEDEMA: Primary | ICD-10-CM

## 2020-11-24 PROCEDURE — 97140 MANUAL THERAPY 1/> REGIONS: CPT | Performed by: PHYSICAL THERAPIST

## 2020-12-01 ENCOUNTER — OFFICE VISIT (OUTPATIENT)
Dept: PHYSICAL THERAPY | Facility: CLINIC | Age: 85
End: 2020-12-01
Payer: MEDICARE

## 2020-12-01 DIAGNOSIS — I89.0 LYMPHEDEMA: Primary | ICD-10-CM

## 2020-12-01 PROCEDURE — 97140 MANUAL THERAPY 1/> REGIONS: CPT | Performed by: PHYSICAL THERAPIST

## 2020-12-04 ENCOUNTER — OFFICE VISIT (OUTPATIENT)
Dept: PHYSICAL THERAPY | Facility: CLINIC | Age: 85
End: 2020-12-04
Payer: MEDICARE

## 2020-12-04 DIAGNOSIS — I89.0 LYMPHEDEMA: Primary | ICD-10-CM

## 2020-12-04 PROCEDURE — 97140 MANUAL THERAPY 1/> REGIONS: CPT | Performed by: PHYSICAL THERAPIST

## 2020-12-08 ENCOUNTER — OFFICE VISIT (OUTPATIENT)
Dept: PHYSICAL THERAPY | Facility: CLINIC | Age: 85
End: 2020-12-08
Payer: MEDICARE

## 2020-12-08 DIAGNOSIS — I89.0 LYMPHEDEMA: Primary | ICD-10-CM

## 2020-12-08 PROCEDURE — 97140 MANUAL THERAPY 1/> REGIONS: CPT | Performed by: PHYSICAL THERAPIST

## 2020-12-11 ENCOUNTER — OFFICE VISIT (OUTPATIENT)
Dept: PHYSICAL THERAPY | Facility: CLINIC | Age: 85
End: 2020-12-11
Payer: MEDICARE

## 2020-12-11 DIAGNOSIS — I89.0 LYMPHEDEMA: Primary | ICD-10-CM

## 2020-12-11 PROCEDURE — 97140 MANUAL THERAPY 1/> REGIONS: CPT | Performed by: PHYSICAL THERAPIST

## 2020-12-14 ENCOUNTER — OFFICE VISIT (OUTPATIENT)
Dept: PHYSICAL THERAPY | Facility: CLINIC | Age: 85
End: 2020-12-14
Payer: MEDICARE

## 2020-12-14 DIAGNOSIS — I89.0 LYMPHEDEMA: Primary | ICD-10-CM

## 2020-12-14 PROCEDURE — 97140 MANUAL THERAPY 1/> REGIONS: CPT | Performed by: PHYSICAL THERAPIST

## 2020-12-17 ENCOUNTER — APPOINTMENT (OUTPATIENT)
Dept: PHYSICAL THERAPY | Facility: CLINIC | Age: 85
End: 2020-12-17
Payer: MEDICARE

## 2020-12-21 ENCOUNTER — OFFICE VISIT (OUTPATIENT)
Dept: PHYSICAL THERAPY | Facility: CLINIC | Age: 85
End: 2020-12-21
Payer: MEDICARE

## 2020-12-21 DIAGNOSIS — I89.0 LYMPHEDEMA: Primary | ICD-10-CM

## 2020-12-21 PROCEDURE — 97140 MANUAL THERAPY 1/> REGIONS: CPT | Performed by: PHYSICAL THERAPIST

## 2020-12-23 ENCOUNTER — OFFICE VISIT (OUTPATIENT)
Dept: PHYSICAL THERAPY | Facility: CLINIC | Age: 85
End: 2020-12-23
Payer: MEDICARE

## 2020-12-23 DIAGNOSIS — I89.0 LYMPHEDEMA: Primary | ICD-10-CM

## 2020-12-23 PROCEDURE — 97140 MANUAL THERAPY 1/> REGIONS: CPT | Performed by: PHYSICAL THERAPIST

## 2020-12-28 ENCOUNTER — OFFICE VISIT (OUTPATIENT)
Dept: PHYSICAL THERAPY | Facility: CLINIC | Age: 85
End: 2020-12-28
Payer: MEDICARE

## 2020-12-28 DIAGNOSIS — I89.0 LYMPHEDEMA: Primary | ICD-10-CM

## 2020-12-28 PROCEDURE — 97140 MANUAL THERAPY 1/> REGIONS: CPT | Performed by: PHYSICAL THERAPIST

## 2020-12-30 ENCOUNTER — OFFICE VISIT (OUTPATIENT)
Dept: PHYSICAL THERAPY | Facility: CLINIC | Age: 85
End: 2020-12-30
Payer: MEDICARE

## 2020-12-30 DIAGNOSIS — I89.0 LYMPHEDEMA: Primary | ICD-10-CM

## 2020-12-30 PROCEDURE — 97140 MANUAL THERAPY 1/> REGIONS: CPT | Performed by: PHYSICAL THERAPIST

## 2021-01-05 ENCOUNTER — OFFICE VISIT (OUTPATIENT)
Dept: PHYSICAL THERAPY | Facility: CLINIC | Age: 86
End: 2021-01-05
Payer: MEDICARE

## 2021-01-05 DIAGNOSIS — I89.0 LYMPHEDEMA: Primary | ICD-10-CM

## 2021-01-05 PROCEDURE — 97140 MANUAL THERAPY 1/> REGIONS: CPT | Performed by: PHYSICAL THERAPIST

## 2021-01-05 NOTE — PROGRESS NOTES
Daily Note     Today's date: 2021  Patient name: Roxy Gaffney  : 1935  MRN: [id number]  Referring provider: Dodie Hermosillo DPM  Dx:   Encounter Diagnosis     ICD-10-CM    1  Lymphedema  I89 0        Start Time: 1440  Stop Time: 1535  Total time in clinic (min): 55 minutes    Subjective: Pt reports w/o bandage to L anterior lower leg this day, wound closed, no weeping, slightly softer skin to palpation, but ding much better overall  Objective: See treatment diary below    Assessment: Tolerated treatment well  Patient would benefit from continued PT  Pt has several skin breaks R lower leg from scratching is legs w/ hands when don/doffing garments  L lower leg wound has healed, still softness to skin in region w/ moderate dry scaling thru posterior calf and B feet w/ moderate erythema  Plan: Continue per plan of care           Precautions: NKA      Manual  12-8 12-14 -21      MLD B LE *modified jph jph jph jph jph jph jph      B LE ready wraps jph jph jph jph jph jph jph                                                 Exercise Diary                                                                                                                                                                                                                                                                                      Modalities

## 2021-01-06 ENCOUNTER — APPOINTMENT (OUTPATIENT)
Dept: PHYSICAL THERAPY | Facility: CLINIC | Age: 86
End: 2021-01-06
Payer: MEDICARE

## 2021-01-08 ENCOUNTER — OFFICE VISIT (OUTPATIENT)
Dept: PHYSICAL THERAPY | Facility: CLINIC | Age: 86
End: 2021-01-08
Payer: MEDICARE

## 2021-01-08 DIAGNOSIS — I89.0 LYMPHEDEMA: Primary | ICD-10-CM

## 2021-01-08 PROCEDURE — 97140 MANUAL THERAPY 1/> REGIONS: CPT | Performed by: PHYSICAL THERAPIST

## 2021-01-08 NOTE — PROGRESS NOTES
Daily Note     Today's date: 2021  Patient name: Valerie Pelaez  : 1935  MRN: [id number]  Referring provider: Andressa Gonzales DPM  Dx:   Encounter Diagnosis     ICD-10-CM    1  Lymphedema  I89 0                   Subjective: Pt reports he will see home care nurse today, anticipates d/c from her care  Objective: See treatment diary below    Assessment: Tolerated treatment well  Patient would benefit from continued PT  Skin breaks R LE closed and healing, L anterior lower leg wound closed and healed w/ skin health gradually improving  Plan: Continue per plan of care           Precautions: NKA      Manual   12-14 12-21     MLD B LE *modified jph jph jph jph jph jph jph jp     B LE ready wraps jph jph jph jph jph jph jph jp                                                Exercise Diary                                                                                                                                                                                                                                                                                      Modalities

## 2021-01-12 ENCOUNTER — OFFICE VISIT (OUTPATIENT)
Dept: PHYSICAL THERAPY | Facility: CLINIC | Age: 86
End: 2021-01-12
Payer: MEDICARE

## 2021-01-12 DIAGNOSIS — I89.0 LYMPHEDEMA: Primary | ICD-10-CM

## 2021-01-12 PROCEDURE — 97140 MANUAL THERAPY 1/> REGIONS: CPT | Performed by: PHYSICAL THERAPIST

## 2021-01-12 NOTE — PROGRESS NOTES
Daily Note     Today's date: 2021  Patient name: Yadira Paula  : 1935  MRN: [id number]  Referring provider: Ze Hawley DPM  Dx:   Encounter Diagnosis     ICD-10-CM    1  Lymphedema  I89 0                   Subjective: Pt reports he has been d/c from nursing home care, his legs are doing better, wound has healed, still issued w/ fragile skin and breaks in skin when he is donning/doffing his socks  Objective: See treatment diary below    Assessment: Tolerated treatment well  Patient would benefit from continued PT  Mod fibrosis of skin, reduced edema overall, wound is closed, healed, and scarred over  Plan: Continue per plan of care           Precautions: NKA      Manual       MLD B LE *modified jph jph jph jph jph jph jph jph jph    B LE ready wraps jph jph jph jph jph jph jph jph jph                                               Exercise Diary                                                                                                                                                                                                                                                                                      Modalities

## 2021-01-15 ENCOUNTER — OFFICE VISIT (OUTPATIENT)
Dept: PHYSICAL THERAPY | Facility: CLINIC | Age: 86
End: 2021-01-15
Payer: MEDICARE

## 2021-01-15 DIAGNOSIS — I89.0 LYMPHEDEMA: Primary | ICD-10-CM

## 2021-01-15 PROCEDURE — 97140 MANUAL THERAPY 1/> REGIONS: CPT | Performed by: PHYSICAL THERAPIST

## 2021-01-15 NOTE — PROGRESS NOTES
Daily Note     Today's date: 1/15/2021  Patient name: Nikki Gibson  : 1935  MRN: [id number]  Referring provider: Green Cogan , DPM  Dx:   Encounter Diagnosis     ICD-10-CM    1  Lymphedema  I89 0                   Subjective: Pt reports he is maintaining hs wraps, still sees some edema on his legs  Objective: See treatment diary below    Assessment: Tolerated treatment well  Patient would benefit from continued PT  Mod fibrosis of skin, cont'd hemosiderin staining, slight increase in edema B LE this day, assessed grossly  Plan: Continue per plan of care           Precautions: NKA      Manual   12-14 12- 12- 12 12--12 1-15   MLD B LE *modified jph jph jph jph jph jph jph jph jph jph   B LE ready wraps jph jph jph jph jph jph jph jph jph jph                                              Exercise Diary                                                                                                                                                                                                                                                                                      Modalities

## 2021-01-20 ENCOUNTER — OFFICE VISIT (OUTPATIENT)
Dept: PHYSICAL THERAPY | Facility: CLINIC | Age: 86
End: 2021-01-20
Payer: MEDICARE

## 2021-01-20 DIAGNOSIS — I89.0 LYMPHEDEMA: Primary | ICD-10-CM

## 2021-01-20 PROCEDURE — 97140 MANUAL THERAPY 1/> REGIONS: CPT | Performed by: PHYSICAL THERAPIST

## 2021-01-20 NOTE — PROGRESS NOTES
Daily Note     Today's date: 2021  Patient name: Maynor Gordon  : 1935  MRN: [id number]  Referring provider: Mariella Arreguin DPM  Dx:   Encounter Diagnosis     ICD-10-CM    1  Lymphedema  I89 0                   Subjective: Pt reports he lotions legs just about every day, feels they are doing much better now overall  Objective: See treatment diary below    Assessment: Tolerated treatment well  Patient would benefit from continued PT  Mod fibrosis lower leg w/ still sig dry scaling to skin, educating pt on Importance of maintaining skin health to avoid infection risk  Plan: Continue per plan of care           Precautions: NKA      Manual           1-15   MLD B LE *modified [facility]   B LE ready wraps [facility]                                              Exercise Diary                                                                                                                                                                                                                                                                                      Modalities

## 2021-01-28 ENCOUNTER — APPOINTMENT (OUTPATIENT)
Dept: PHYSICAL THERAPY | Facility: CLINIC | Age: 86
End: 2021-01-28
Payer: MEDICARE

## 2021-11-02 ENCOUNTER — OFFICE VISIT (OUTPATIENT)
Dept: PHYSICAL THERAPY | Facility: CLINIC | Age: 86
End: 2021-11-02
Payer: MEDICARE

## 2021-11-02 DIAGNOSIS — I89.0 LYMPHEDEMA: Primary | ICD-10-CM

## 2021-11-02 PROCEDURE — 97163 PT EVAL HIGH COMPLEX 45 MIN: CPT | Performed by: PHYSICAL THERAPIST

## 2021-11-16 ENCOUNTER — OFFICE VISIT (OUTPATIENT)
Dept: PHYSICAL THERAPY | Facility: CLINIC | Age: 86
End: 2021-11-16
Payer: MEDICARE

## 2021-11-16 DIAGNOSIS — I89.0 LYMPHEDEMA: Primary | ICD-10-CM

## 2021-11-16 PROCEDURE — 97140 MANUAL THERAPY 1/> REGIONS: CPT | Performed by: PHYSICAL THERAPIST

## 2021-11-23 ENCOUNTER — OFFICE VISIT (OUTPATIENT)
Dept: PHYSICAL THERAPY | Facility: CLINIC | Age: 86
End: 2021-11-23
Payer: MEDICARE

## 2021-11-23 DIAGNOSIS — I89.0 LYMPHEDEMA: Primary | ICD-10-CM

## 2021-11-23 PROCEDURE — 97140 MANUAL THERAPY 1/> REGIONS: CPT | Performed by: PHYSICAL THERAPIST

## 2021-11-29 ENCOUNTER — OFFICE VISIT (OUTPATIENT)
Dept: PHYSICAL THERAPY | Facility: CLINIC | Age: 86
End: 2021-11-29
Payer: MEDICARE

## 2021-11-29 DIAGNOSIS — I89.0 LYMPHEDEMA: Primary | ICD-10-CM

## 2021-11-29 PROCEDURE — 97140 MANUAL THERAPY 1/> REGIONS: CPT | Performed by: PHYSICAL THERAPIST

## 2021-12-01 ENCOUNTER — APPOINTMENT (OUTPATIENT)
Dept: PHYSICAL THERAPY | Facility: CLINIC | Age: 86
End: 2021-12-01
Payer: MEDICARE

## 2021-12-06 ENCOUNTER — OFFICE VISIT (OUTPATIENT)
Dept: PHYSICAL THERAPY | Facility: CLINIC | Age: 86
End: 2021-12-06
Payer: MEDICARE

## 2021-12-06 DIAGNOSIS — I89.0 LYMPHEDEMA: Primary | ICD-10-CM

## 2021-12-06 PROCEDURE — 97140 MANUAL THERAPY 1/> REGIONS: CPT | Performed by: PHYSICAL THERAPIST

## 2021-12-08 ENCOUNTER — OFFICE VISIT (OUTPATIENT)
Dept: PHYSICAL THERAPY | Facility: CLINIC | Age: 86
End: 2021-12-08
Payer: MEDICARE

## 2021-12-08 DIAGNOSIS — I89.0 LYMPHEDEMA: Primary | ICD-10-CM

## 2021-12-08 PROCEDURE — 97140 MANUAL THERAPY 1/> REGIONS: CPT | Performed by: PHYSICAL THERAPIST

## 2021-12-16 ENCOUNTER — OFFICE VISIT (OUTPATIENT)
Dept: PHYSICAL THERAPY | Facility: CLINIC | Age: 86
End: 2021-12-16
Payer: MEDICARE

## 2021-12-16 DIAGNOSIS — I89.0 LYMPHEDEMA: Primary | ICD-10-CM

## 2021-12-16 PROCEDURE — 97140 MANUAL THERAPY 1/> REGIONS: CPT | Performed by: PHYSICAL THERAPIST

## 2021-12-21 ENCOUNTER — OFFICE VISIT (OUTPATIENT)
Dept: PHYSICAL THERAPY | Facility: CLINIC | Age: 86
End: 2021-12-21
Payer: MEDICARE

## 2021-12-21 DIAGNOSIS — I89.0 LYMPHEDEMA: Primary | ICD-10-CM

## 2021-12-21 PROCEDURE — 97140 MANUAL THERAPY 1/> REGIONS: CPT | Performed by: PHYSICAL THERAPIST

## 2021-12-27 ENCOUNTER — APPOINTMENT (OUTPATIENT)
Dept: PHYSICAL THERAPY | Facility: CLINIC | Age: 86
End: 2021-12-27
Payer: MEDICARE

## 2021-12-29 ENCOUNTER — APPOINTMENT (OUTPATIENT)
Dept: PHYSICAL THERAPY | Facility: CLINIC | Age: 86
End: 2021-12-29
Payer: MEDICARE

## 2022-01-03 ENCOUNTER — OFFICE VISIT (OUTPATIENT)
Dept: PHYSICAL THERAPY | Facility: CLINIC | Age: 87
End: 2022-01-03
Payer: MEDICARE

## 2022-01-03 DIAGNOSIS — I89.0 LYMPHEDEMA: Primary | ICD-10-CM

## 2022-01-03 PROCEDURE — 97140 MANUAL THERAPY 1/> REGIONS: CPT | Performed by: PHYSICAL THERAPIST

## 2022-01-03 PROCEDURE — 97164 PT RE-EVAL EST PLAN CARE: CPT | Performed by: PHYSICAL THERAPIST

## 2022-01-03 NOTE — LETTER
2022    Marlyn Ayala DPM  [facility] [id number]    Patient: Hazel Montoya   YOB: 1935   Date of Visit: 1/3/2022     Encounter Diagnosis     ICD-10-CM    1  Lymphedema  I89 0        Dear Dr Audrey Farris: Thank you for your recent referral of Hazel Montoya  Please review the attached evaluation summary from Steve's recent visit  Please verify that you agree with the plan of care by signing the attached order  If you have any questions or concerns, please do not hesitate to call  I sincerely appreciate the opportunity to share in the care of one of your patients and hope to have another opportunity to work with you in the near future  Sincerely,    Dorothy Robertson, PT      Referring Provider:      I certify that I have read the below Plan of Care and certify the need for these services furnished under this plan of treatment while under my care  Marlyn Ayala DPM  [id number] [place] [id number]  Via Fax: [phone]          Daily Note + PT Re-Evaluation    Today's date: 1/3/2022  Patient name: Hazel Montoya  : 1935  MRN: [id number]  Referring provider: Johnny Devries DPM  Dx:   Encounter Diagnosis     ICD-10-CM    1  Lymphedema  I89 0                   Subjective: Pt reports improvement overall admiting still dry scaly skin but much better reduction in heaviness and skin is softening up  Does apply lotion and utilizing wraps as instructed daily  Pt noticing his wounds are healing on his feet and there has not been any weeping from the legs anymore  Goals  ST  Reduce B LE vol >5% within 3 wks-MET  2  Obtain B LE compression wraps within 2 wks-MET  3  Reduce wounds B LE from open to closed within 3 wks w/ no weeping exudate-MET  LT  Reduce B LE volume >10% within 6 wks-Partially met  2   I in self management phase 2 CDT protocol within 6 wks-Not met  3  Reduce B LE fibrosis to mild within 6 wks-Partially met    Plan  Patient would benefit from: skilled physical therapy  Planned therapy interventions: massage, manual therapy, patient education, home exercise program and compression  Frequency: 2x week  Duration in visits: 8  Duration in weeks: 4  Plan of Care beginning date: 1/3/2022  Plan of Care expiration date: 2022  Treatment plan discussed with: patient        Subjective Evaluation    History of Present Illness  Mechanism of injury: Pt presents for assessment of B LE chronic edema w/ c/o more recent increase in L LE edema along w/ numerous superficial wounds that have developed including marked dry scaly skin  Pt reports difficulty applying own compression wraps and that they have worn out  Pt admits PE and DVT diagnosis earlier this yr and maintains anti-coagulation therapy stating her has been cleared to start therapy  Pt also admits Covid-19 hospitalization this past summer which still yields some issue w/ breathing       Pain  Current pain ratin  At best pain ratin  At worst pain ratin  Location: B LE  Quality: cramping, discomfort and tight    Treatments  Previous treatment: physical therapy  Patient Goals  Patient goals for therapy: decreased edema, decreased pain, independence with ADLs/IADLs and return to sport/leisure activities  Patient goal: Heal wounds, fit for new wraps      Objective: See treatment diary below  LOWER EXTREMITY LEFT CALCULATIONS      Most Recent Value   Volume LE (mL) 4801   Difference from last visit (mL)  1047   Difference from first visit (mL)  1047   Difference from last visit (%)  18   Difference from first visit (%)  18      LOWER EXTREMITY RIGHT CALCULATIONS      Most Recent Value   Volume LE (mL) 4707   Difference from last visit (mL)  333   Difference from first visit (mL)  333   Difference from last visit (%)  7   Difference from first visit (%)  7        Physical assessment:  Palpation: Mild ttp B LE  Skin Mobility: Fair, mod skin fibrosis w/ dry scaly skin  Skin color: Marked hemsoiderin staining derrick brown to purple w/ mild erythema distal 2/3rd of B lower legs  Pitting: trace to +1 t/o B LE  Wound presence: closed  Wound size: See above  Wound color:  See above  Stemmer's Sign: + B LE  Gait assessment: SPC, antalgic, slowed daniel  Transfer status: B UE A  Ability to don/doff clothing/garments: Min A      Assessment: Tolerated treatment well  Patient would benefit from continued PT Cont'd healing w/ superficial scratches closed and good vol reduction noted B LE Responding well to MLD and compression but still mod fibrosis and dry scaling of skin along w/ shelving now mild  and still edema presence in B knee region to just proximal the termination point of the wraps  Cont'd skilled PT is necessary as pt advances along in POC toward established goals  Pt requires consistent skilled care to don wraps and provided MLD B LE to address severity of lymphedema, unable to self manage  Plan: Continue per plan of care        Precautions: LOB/FALL RISK, Chronic DVT/PE, anti-coagulation therapy      Manuals 11-16 11-23 11-29 12-6 12-8 12-16 12-21 1-3     [facility]D [facility] - [facility]     B LE compression wraps [facility] jph jph jph jph jph jph jp                               Neuro Re-Ed                                                                                                        Ther Ex                                                                                                                     Ther Activity                                       Gait Training                                       Modalities

## 2022-01-03 NOTE — PROGRESS NOTES
Daily Note + PT Re-Evaluation    Today's date: 1/3/2022  Patient name: Chnaa Pettit  : 1935  MRN: [id number]  Referring provider: Nnamdi Vaz DPM  Dx:   Encounter Diagnosis     ICD-10-CM    1  Lymphedema  I89 0                   Subjective: Pt reports improvement overall admiting still dry scaly skin but much better reduction in heaviness and skin is softening up  Does apply lotion and utilizing wraps as instructed daily  Pt noticing his wounds are healing on his feet and there has not been any weeping from the legs anymore  Goals  ST  Reduce B LE vol >5% within 3 wks-MET  2  Obtain B LE compression wraps within 2 wks-MET  3  Reduce wounds B LE from open to closed within 3 wks w/ no weeping exudate-MET  LT  Reduce B LE volume >10% within 6 wks-Partially met  2  I in self management phase 2 CDT protocol within 6 wks-Not met  3  Reduce B LE fibrosis to mild within 6 wks-Partially met    Plan  Patient would benefit from: skilled physical therapy  Planned therapy interventions: massage, manual therapy, patient education, home exercise program and compression  Frequency: 2x week  Duration in visits: 8  Duration in weeks: 4  Plan of Care beginning date: 1/3/2022  Plan of Care expiration date: 2022  Treatment plan discussed with: patient        Subjective Evaluation    History of Present Illness  Mechanism of injury: Pt presents for assessment of B LE chronic edema w/ c/o more recent increase in L LE edema along w/ numerous superficial wounds that have developed including marked dry scaly skin  Pt reports difficulty applying own compression wraps and that they have worn out  Pt admits PE and DVT diagnosis earlier this yr and maintains anti-coagulation therapy stating her has been cleared to start therapy  Pt also admits Covid-19 hospitalization this past summer which still yields some issue w/ breathing       Pain  Current pain ratin  At best pain ratin  At worst pain ratin  Location: B LE  Quality: cramping, discomfort and tight    Treatments  Previous treatment: physical therapy  Patient Goals  Patient goals for therapy: decreased edema, decreased pain, independence with ADLs/IADLs and return to sport/leisure activities  Patient goal: Heal wounds, fit for new wraps      Objective: See treatment diary below  LOWER EXTREMITY LEFT CALCULATIONS      Most Recent Value   Volume LE (mL) 4801   Difference from last visit (mL)  1047   Difference from first visit (mL)  1047   Difference from last visit (%)  18   Difference from first visit (%)  18      LOWER EXTREMITY RIGHT CALCULATIONS      Most Recent Value   Volume LE (mL) 4707   Difference from last visit (mL)  333   Difference from first visit (mL)  333   Difference from last visit (%)  7   Difference from first visit (%)  7        Physical assessment:  Palpation: Mild ttp B LE  Skin Mobility: Fair, mod skin fibrosis w/ dry scaly skin  Skin color: Marked hemsoiderin staining derrick brown to purple w/ mild erythema distal 2/3rd of B lower legs  Pitting: trace to +1 t/o B LE  Wound presence: closed  Wound size: See above  Wound color:  See above  Stemmer's Sign: + B LE  Gait assessment: SPC, antalgic, slowed daniel  Transfer status: B UE A  Ability to don/doff clothing/garments: Min A      Assessment: Tolerated treatment well  Patient would benefit from continued PT Cont'd healing w/ superficial scratches closed and good vol reduction noted B LE Responding well to MLD and compression but still mod fibrosis and dry scaling of skin along w/ shelving now mild  and still edema presence in B knee region to just proximal the termination point of the wraps  Cont'd skilled PT is necessary as pt advances along in POC toward established goals  Pt requires consistent skilled care to don wraps and provided MLD B LE to address severity of lymphedema, unable to self manage  Plan: Continue per plan of care        Precautions: LOB/FALL RISK, Chronic DVT/PE, anti-coagulation therapy      Manuals 11-16 11-23 11-29 12-6 12-8 12-16 12-21 1-3     MLD B LE - modified [facility]     B LE compression wraps [facility]                               Neuro Re-Ed                                                                                                        Ther Ex                                                                                                                     Ther Activity                                       Gait Training                                       Modalities

## 2022-01-05 ENCOUNTER — OFFICE VISIT (OUTPATIENT)
Dept: PHYSICAL THERAPY | Facility: CLINIC | Age: 87
End: 2022-01-05
Payer: MEDICARE

## 2022-01-05 DIAGNOSIS — I89.0 LYMPHEDEMA: Primary | ICD-10-CM

## 2022-01-05 PROCEDURE — 97140 MANUAL THERAPY 1/> REGIONS: CPT | Performed by: PHYSICAL THERAPIST

## 2022-01-05 NOTE — PROGRESS NOTES
Daily Note     Today's date: 2022  Patient name: Lux Cao  : 1935  MRN: [id number]  Referring provider: Abel Busch DPM  Dx:   Encounter Diagnosis     ICD-10-CM    1  Lymphedema  I89 0                   Subjective: Pt feels his legs cont to improve, not as heavy but realized he must maintain wraps  Objective: See treatment diary below      Assessment: Tolerated treatment well  Patient would benefit from continued PT  Making sig progress in vol reduction, reduced fibrosis, decreasing skin dryness and healing of wounds B feet  Plan: Continue per plan of care        Precautions: LOB/FALL RISK, Chronic DVT/PE, anti-coagulation therapy      Manuals 11-16 11-23 11-29 12-6 12-8 12-16 12-21 1-3 1-5    MLD B LE - modified jph jph jph jph jph jph jph jph jph    B LE compression wraps jph jph jph jph jph jph jph jph jph                              Neuro Re-Ed                                                                                                        Ther Ex                                                                                                                     Ther Activity                                       Gait Training                                       Modalities

## 2022-01-10 ENCOUNTER — OFFICE VISIT (OUTPATIENT)
Dept: PHYSICAL THERAPY | Facility: CLINIC | Age: 87
End: 2022-01-10
Payer: MEDICARE

## 2022-01-10 DIAGNOSIS — I89.0 LYMPHEDEMA: Primary | ICD-10-CM

## 2022-01-10 PROCEDURE — 97140 MANUAL THERAPY 1/> REGIONS: CPT | Performed by: PHYSICAL THERAPIST

## 2022-01-10 NOTE — PROGRESS NOTES
Daily Note     Today's date: 1/10/2022  Patient name: Tay Sandra  : 1935  MRN: [id number]  Referring provider: Eduardo Beauchamp DPM  Dx:   Encounter Diagnosis     ICD-10-CM    1  Lymphedema  I89 0                   Subjective: Pt reports legs are a lil heavy today but better overall  Objective: See treatment diary below      Assessment: Tolerated treatment well  Patient would benefit from continued PT  Cont'd healing in B feet of small wounds, closed, no exudate, still marked dryness to skin, encouraged daily usage of lotion  Plan: Continue per plan of care        Precautions: LOB/FALL RISK, Chronic DVT/PE, anti-coagulation therapy      Manuals 11-16 11- 11-29 12-6 12-8 12-16 12-21 1-3 1-5 1-10   MLD B LE - modified jph jph jph jph jph jph jph jph jph jph   B LE compression wraps jph jph jph jph jph jph jph jph jph jph                             Neuro Re-Ed                                                                                                        Ther Ex                                                                                                                     Ther Activity                                       Gait Training                                       Modalities

## 2022-01-12 ENCOUNTER — APPOINTMENT (OUTPATIENT)
Dept: PHYSICAL THERAPY | Facility: CLINIC | Age: 87
End: 2022-01-12
Payer: MEDICARE

## 2022-04-20 ENCOUNTER — NEW PATIENT (OUTPATIENT)
Dept: URBAN - METROPOLITAN AREA CLINIC 6 | Facility: CLINIC | Age: 87
End: 2022-04-20

## 2022-04-20 DIAGNOSIS — H53.2: ICD-10-CM

## 2022-04-20 DIAGNOSIS — Z96.1: ICD-10-CM

## 2022-04-20 PROCEDURE — 92060 SENSORIMOTOR EXAMINATION: CPT

## 2022-04-20 PROCEDURE — 99204 OFFICE O/P NEW MOD 45 MIN: CPT

## 2022-04-20 ASSESSMENT — VISUAL ACUITY
OU_CC: J1
OS_CC: 20/30
OD_CC: 20/25

## 2022-04-20 ASSESSMENT — TONOMETRY
OS_IOP_MMHG: 22
OD_IOP_MMHG: 19